# Patient Record
Sex: MALE | Race: WHITE | Employment: FULL TIME | URBAN - NONMETROPOLITAN AREA
[De-identification: names, ages, dates, MRNs, and addresses within clinical notes are randomized per-mention and may not be internally consistent; named-entity substitution may affect disease eponyms.]

---

## 2018-04-12 ENCOUNTER — HOSPITAL ENCOUNTER (INPATIENT)
Age: 38
LOS: 2 days | Discharge: HOME | DRG: 494 | End: 2018-04-14
Attending: FAMILY MEDICINE | Admitting: ORTHOPAEDIC SURGERY
Payer: COMMERCIAL

## 2018-04-12 ENCOUNTER — APPOINTMENT (OUTPATIENT)
Dept: GENERAL RADIOLOGY | Age: 38
DRG: 494 | End: 2018-04-12
Payer: COMMERCIAL

## 2018-04-12 ENCOUNTER — APPOINTMENT (OUTPATIENT)
Dept: CT IMAGING | Age: 38
DRG: 494 | End: 2018-04-12
Payer: COMMERCIAL

## 2018-04-12 ENCOUNTER — ANESTHESIA EVENT (OUTPATIENT)
Dept: OPERATING ROOM | Age: 38
DRG: 494 | End: 2018-04-12
Payer: COMMERCIAL

## 2018-04-12 DIAGNOSIS — S42.202A CLOSED FRACTURE OF PROXIMAL END OF LEFT HUMERUS, UNSPECIFIED FRACTURE MORPHOLOGY, INITIAL ENCOUNTER: ICD-10-CM
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
## 2018-04-12 DIAGNOSIS — S42.322A CLOSED DISPLACED TRANSVERSE FRACTURE OF SHAFT OF LEFT HUMERUS, INITIAL ENCOUNTER: ICD-10-CM

## 2018-04-12 DIAGNOSIS — S42.292A HUMERUS HEAD FRACTURE, LEFT, CLOSED, INITIAL ENCOUNTER: ICD-10-CM
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
## 2018-04-12 PROBLEM — S42.392A: Status: ACTIVE | Noted: 2018-04-12

## 2018-04-12 LAB
ALBUMIN SERPL-MCNC: 4.7 G/DL (ref 3.5–5.1)
ALP BLD-CCNC: 60 U/L (ref 38–126)
ALT SERPL-CCNC: 17 U/L (ref 11–66)
ANION GAP SERPL CALCULATED.3IONS-SCNC: 13 MEQ/L (ref 8–16)
ANISOCYTOSIS: ABNORMAL
AST SERPL-CCNC: 22 U/L (ref 5–40)
BASOPHILS # BLD: 0.1 %
BASOPHILS ABSOLUTE: 0 THOU/MM3 (ref 0–0.1)
BILIRUB SERPL-MCNC: 1.2 MG/DL (ref 0.3–1.2)
BUN BLDV-MCNC: 13 MG/DL (ref 7–22)
CALCIUM SERPL-MCNC: 9.2 MG/DL (ref 8.5–10.5)
CHLORIDE BLD-SCNC: 105 MEQ/L (ref 98–111)
CO2: 21 MEQ/L (ref 23–33)
CREAT SERPL-MCNC: 0.7 MG/DL (ref 0.4–1.2)
EKG ATRIAL RATE: 73 BPM
EKG P AXIS: 36 DEGREES
EKG P-R INTERVAL: 140 MS
EKG Q-T INTERVAL: 384 MS
EKG QRS DURATION: 102 MS
EKG QTC CALCULATION (BAZETT): 423 MS
EKG R AXIS: 65 DEGREES
EKG T AXIS: 61 DEGREES
EKG VENTRICULAR RATE: 73 BPM
EOSINOPHIL # BLD: 0.1 %
EOSINOPHILS ABSOLUTE: 0 THOU/MM3 (ref 0–0.4)
GFR SERPL CREATININE-BSD FRML MDRD: > 90 ML/MIN/1.73M2
GLUCOSE BLD-MCNC: 122 MG/DL (ref 70–108)
HCT VFR BLD CALC: 39.3 % (ref 42–52)
HEMOGLOBIN: 14 GM/DL (ref 14–18)
LYMPHOCYTES # BLD: 2.4 %
LYMPHOCYTES ABSOLUTE: 0.7 THOU/MM3 (ref 1–4.8)
MCH RBC QN AUTO: 31.3 PG (ref 27–31)
MCHC RBC AUTO-ENTMCNC: 35.6 GM/DL (ref 33–37)
MCV RBC AUTO: 87.9 FL (ref 80–94)
MONOCYTES # BLD: 2.6 %
MONOCYTES ABSOLUTE: 0.7 THOU/MM3 (ref 0.4–1.3)
NUCLEATED RED BLOOD CELLS: 0 /100 WBC
OSMOLALITY CALCULATION: 279 MOSMOL/KG (ref 275–300)
PATHOLOGIST REVIEW: ABNORMAL
PDW BLD-RTO: 15.9 % (ref 11.5–14.5)
PLATELET # BLD: 210 THOU/MM3 (ref 130–400)
PLATELET ESTIMATE: ADEQUATE
PMV BLD AUTO: 9.5 FL (ref 7.4–10.4)
POIKILOCYTES: SLIGHT
POTASSIUM SERPL-SCNC: 3.9 MEQ/L (ref 3.5–5.2)
RBC # BLD: 4.47 MILL/MM3 (ref 4.7–6.1)
SCAN OF BLOOD SMEAR: NORMAL
SEG NEUTROPHILS: 94.8 %
SEGMENTED NEUTROPHILS ABSOLUTE COUNT: 26.4 THOU/MM3 (ref 1.8–7.7)
SODIUM BLD-SCNC: 139 MEQ/L (ref 135–145)
TOTAL PROTEIN: 7.1 G/DL (ref 6.1–8)
WBC # BLD: 27.8 THOU/MM3 (ref 4.8–10.8)

## 2018-04-12 PROCEDURE — 6820000001 HC L2 TRAUMA SURGERY EVALUATION

## 2018-04-12 PROCEDURE — 96376 TX/PRO/DX INJ SAME DRUG ADON: CPT

## 2018-04-12 PROCEDURE — 73070 X-RAY EXAM OF ELBOW: CPT

## 2018-04-12 PROCEDURE — 73030 X-RAY EXAM OF SHOULDER: CPT

## 2018-04-12 PROCEDURE — 70450 CT HEAD/BRAIN W/O DYE: CPT

## 2018-04-12 PROCEDURE — 6370000000 HC RX 637 (ALT 250 FOR IP): Performed by: NURSE PRACTITIONER

## 2018-04-12 PROCEDURE — 99285 EMERGENCY DEPT VISIT HI MDM: CPT

## 2018-04-12 PROCEDURE — 74177 CT ABD & PELVIS W/CONTRAST: CPT

## 2018-04-12 PROCEDURE — 96374 THER/PROPH/DIAG INJ IV PUSH: CPT

## 2018-04-12 PROCEDURE — 93005 ELECTROCARDIOGRAM TRACING: CPT | Performed by: FAMILY MEDICINE

## 2018-04-12 PROCEDURE — 6360000002 HC RX W HCPCS: Performed by: EMERGENCY MEDICINE

## 2018-04-12 PROCEDURE — 80053 COMPREHEN METABOLIC PANEL: CPT

## 2018-04-12 PROCEDURE — 36415 COLL VENOUS BLD VENIPUNCTURE: CPT

## 2018-04-12 PROCEDURE — 96375 TX/PRO/DX INJ NEW DRUG ADDON: CPT

## 2018-04-12 PROCEDURE — 71260 CT THORAX DX C+: CPT

## 2018-04-12 PROCEDURE — 1200000003 HC TELEMETRY R&B

## 2018-04-12 PROCEDURE — 73060 X-RAY EXAM OF HUMERUS: CPT

## 2018-04-12 PROCEDURE — 2580000003 HC RX 258: Performed by: NURSE PRACTITIONER

## 2018-04-12 PROCEDURE — 72125 CT NECK SPINE W/O DYE: CPT

## 2018-04-12 PROCEDURE — 85025 COMPLETE CBC W/AUTO DIFF WBC: CPT

## 2018-04-12 PROCEDURE — 99253 IP/OBS CNSLTJ NEW/EST LOW 45: CPT | Performed by: SURGERY

## 2018-04-12 PROCEDURE — 6360000004 HC RX CONTRAST MEDICATION: Performed by: EMERGENCY MEDICINE

## 2018-04-12 RX ORDER — ACETAMINOPHEN 325 MG/1
650 TABLET ORAL EVERY 4 HOURS PRN
Status: DISCONTINUED | OUTPATIENT
Start: 2018-04-12 | End: 2018-04-12 | Stop reason: SDUPTHER

## 2018-04-12 RX ORDER — MORPHINE SULFATE 4 MG/ML
4 INJECTION, SOLUTION INTRAMUSCULAR; INTRAVENOUS ONCE
Status: COMPLETED | OUTPATIENT
Start: 2018-04-12 | End: 2018-04-12

## 2018-04-12 RX ORDER — MORPHINE SULFATE 2 MG/ML
2 INJECTION, SOLUTION INTRAMUSCULAR; INTRAVENOUS ONCE
Status: COMPLETED | OUTPATIENT
Start: 2018-04-12 | End: 2018-04-12

## 2018-04-12 RX ORDER — ACETAMINOPHEN 325 MG/1
650 TABLET ORAL EVERY 4 HOURS PRN
Status: DISCONTINUED | OUTPATIENT
Start: 2018-04-12 | End: 2018-04-14 | Stop reason: HOSPADM

## 2018-04-12 RX ORDER — SODIUM CHLORIDE 0.9 % (FLUSH) 0.9 %
10 SYRINGE (ML) INJECTION EVERY 12 HOURS SCHEDULED
Status: DISCONTINUED | OUTPATIENT
Start: 2018-04-12 | End: 2018-04-12 | Stop reason: SDUPTHER

## 2018-04-12 RX ORDER — ONDANSETRON 2 MG/ML
4 INJECTION INTRAMUSCULAR; INTRAVENOUS EVERY 6 HOURS PRN
Status: DISCONTINUED | OUTPATIENT
Start: 2018-04-12 | End: 2018-04-12 | Stop reason: SDUPTHER

## 2018-04-12 RX ORDER — SODIUM CHLORIDE 0.9 % (FLUSH) 0.9 %
10 SYRINGE (ML) INJECTION EVERY 12 HOURS SCHEDULED
Status: DISCONTINUED | OUTPATIENT
Start: 2018-04-12 | End: 2018-04-14 | Stop reason: HOSPADM

## 2018-04-12 RX ORDER — ONDANSETRON 2 MG/ML
4 INJECTION INTRAMUSCULAR; INTRAVENOUS ONCE
Status: COMPLETED | OUTPATIENT
Start: 2018-04-12 | End: 2018-04-12

## 2018-04-12 RX ORDER — MORPHINE SULFATE 4 MG/ML
4 INJECTION, SOLUTION INTRAMUSCULAR; INTRAVENOUS
Status: ACTIVE | OUTPATIENT
Start: 2018-04-12 | End: 2018-04-13

## 2018-04-12 RX ORDER — OXYCODONE HYDROCHLORIDE AND ACETAMINOPHEN 5; 325 MG/1; MG/1
1 TABLET ORAL EVERY 4 HOURS PRN
Status: DISCONTINUED | OUTPATIENT
Start: 2018-04-12 | End: 2018-04-12 | Stop reason: SDUPTHER

## 2018-04-12 RX ORDER — MORPHINE SULFATE 2 MG/ML
2 INJECTION, SOLUTION INTRAMUSCULAR; INTRAVENOUS
Status: ACTIVE | OUTPATIENT
Start: 2018-04-12 | End: 2018-04-13

## 2018-04-12 RX ORDER — SODIUM CHLORIDE 9 MG/ML
INJECTION, SOLUTION INTRAVENOUS CONTINUOUS
Status: DISCONTINUED | OUTPATIENT
Start: 2018-04-12 | End: 2018-04-14 | Stop reason: HOSPADM

## 2018-04-12 RX ORDER — SODIUM CHLORIDE 0.9 % (FLUSH) 0.9 %
10 SYRINGE (ML) INJECTION PRN
Status: DISCONTINUED | OUTPATIENT
Start: 2018-04-12 | End: 2018-04-12 | Stop reason: SDUPTHER

## 2018-04-12 RX ORDER — MORPHINE SULFATE 4 MG/ML
4 INJECTION, SOLUTION INTRAMUSCULAR; INTRAVENOUS
Status: DISCONTINUED | OUTPATIENT
Start: 2018-04-12 | End: 2018-04-12 | Stop reason: SDUPTHER

## 2018-04-12 RX ORDER — ONDANSETRON 2 MG/ML
4 INJECTION INTRAMUSCULAR; INTRAVENOUS EVERY 6 HOURS PRN
Status: DISCONTINUED | OUTPATIENT
Start: 2018-04-12 | End: 2018-04-14 | Stop reason: HOSPADM

## 2018-04-12 RX ORDER — OXYCODONE HYDROCHLORIDE AND ACETAMINOPHEN 5; 325 MG/1; MG/1
2 TABLET ORAL EVERY 4 HOURS PRN
Status: DISCONTINUED | OUTPATIENT
Start: 2018-04-12 | End: 2018-04-14 | Stop reason: HOSPADM

## 2018-04-12 RX ORDER — OXYCODONE HYDROCHLORIDE AND ACETAMINOPHEN 5; 325 MG/1; MG/1
2 TABLET ORAL EVERY 4 HOURS PRN
Status: DISCONTINUED | OUTPATIENT
Start: 2018-04-12 | End: 2018-04-12 | Stop reason: SDUPTHER

## 2018-04-12 RX ORDER — OXYCODONE HYDROCHLORIDE AND ACETAMINOPHEN 5; 325 MG/1; MG/1
1 TABLET ORAL EVERY 4 HOURS PRN
Status: DISCONTINUED | OUTPATIENT
Start: 2018-04-12 | End: 2018-04-14 | Stop reason: HOSPADM

## 2018-04-12 RX ORDER — SODIUM CHLORIDE 0.9 % (FLUSH) 0.9 %
10 SYRINGE (ML) INJECTION PRN
Status: DISCONTINUED | OUTPATIENT
Start: 2018-04-12 | End: 2018-04-14 | Stop reason: HOSPADM

## 2018-04-12 RX ORDER — MORPHINE SULFATE 2 MG/ML
2 INJECTION, SOLUTION INTRAMUSCULAR; INTRAVENOUS
Status: DISCONTINUED | OUTPATIENT
Start: 2018-04-12 | End: 2018-04-12 | Stop reason: SDUPTHER

## 2018-04-12 RX ADMIN — MORPHINE SULFATE 4 MG: 4 INJECTION, SOLUTION INTRAMUSCULAR; INTRAVENOUS at 09:00

## 2018-04-12 RX ADMIN — SODIUM CHLORIDE: 9 INJECTION, SOLUTION INTRAVENOUS at 14:36

## 2018-04-12 RX ADMIN — OXYCODONE HYDROCHLORIDE AND ACETAMINOPHEN 2 TABLET: 5; 325 TABLET ORAL at 20:36

## 2018-04-12 RX ADMIN — MORPHINE SULFATE 2 MG: 2 INJECTION, SOLUTION INTRAMUSCULAR; INTRAVENOUS at 10:27

## 2018-04-12 RX ADMIN — OXYCODONE HYDROCHLORIDE AND ACETAMINOPHEN 2 TABLET: 5; 325 TABLET ORAL at 14:34

## 2018-04-12 RX ADMIN — MORPHINE SULFATE 4 MG: 4 INJECTION, SOLUTION INTRAMUSCULAR; INTRAVENOUS at 07:52

## 2018-04-12 RX ADMIN — Medication 10 ML: at 20:35

## 2018-04-12 RX ADMIN — MORPHINE SULFATE 4 MG: 4 INJECTION, SOLUTION INTRAMUSCULAR; INTRAVENOUS at 06:52

## 2018-04-12 RX ADMIN — IOPAMIDOL 80 ML: 755 INJECTION, SOLUTION INTRAVENOUS at 07:39

## 2018-04-12 RX ADMIN — ONDANSETRON 4 MG: 2 INJECTION INTRAMUSCULAR; INTRAVENOUS at 06:52

## 2018-04-12 ASSESSMENT — PAIN DESCRIPTION - ORIENTATION
ORIENTATION: LEFT

## 2018-04-12 ASSESSMENT — PAIN DESCRIPTION - PAIN TYPE
TYPE: ACUTE PAIN

## 2018-04-12 ASSESSMENT — ENCOUNTER SYMPTOMS
VOMITING: 0
ABDOMINAL DISTENTION: 0
EYE PAIN: 0
BACK PAIN: 0
TROUBLE SWALLOWING: 0
SORE THROAT: 0
COUGH: 0
COLOR CHANGE: 0
EYES NEGATIVE: 1
SHORTNESS OF BREATH: 0
ABDOMINAL PAIN: 0
FACIAL SWELLING: 0
NAUSEA: 0
CHOKING: 0
VOICE CHANGE: 0
CHEST TIGHTNESS: 0

## 2018-04-12 ASSESSMENT — PAIN SCALES - GENERAL
PAINLEVEL_OUTOF10: 9
PAINLEVEL_OUTOF10: 8
PAINLEVEL_OUTOF10: 9
PAINLEVEL_OUTOF10: 10
PAINLEVEL_OUTOF10: 10
PAINLEVEL_OUTOF10: 8
PAINLEVEL_OUTOF10: 8
PAINLEVEL_OUTOF10: 10
PAINLEVEL_OUTOF10: 10
PAINLEVEL_OUTOF10: 6

## 2018-04-12 ASSESSMENT — PAIN DESCRIPTION - LOCATION
LOCATION: ARM
LOCATION: ARM;SHOULDER
LOCATION: ARM

## 2018-04-12 ASSESSMENT — PAIN DESCRIPTION - PROGRESSION
CLINICAL_PROGRESSION: GRADUALLY IMPROVING
CLINICAL_PROGRESSION: NOT CHANGED
CLINICAL_PROGRESSION: GRADUALLY IMPROVING

## 2018-04-12 ASSESSMENT — PAIN DESCRIPTION - DESCRIPTORS
DESCRIPTORS: ACHING;SHARP
DESCRIPTORS: ACHING
DESCRIPTORS: ACHING;SHARP

## 2018-04-12 ASSESSMENT — PAIN DESCRIPTION - ONSET
ONSET: ON-GOING
ONSET: ON-GOING

## 2018-04-12 ASSESSMENT — PAIN DESCRIPTION - FREQUENCY
FREQUENCY: CONTINUOUS

## 2018-04-13 ENCOUNTER — ANESTHESIA (OUTPATIENT)
Dept: OPERATING ROOM | Age: 38
DRG: 494 | End: 2018-04-13
Payer: COMMERCIAL

## 2018-04-13 ENCOUNTER — APPOINTMENT (OUTPATIENT)
Dept: GENERAL RADIOLOGY | Age: 38
DRG: 494 | End: 2018-04-13
Payer: COMMERCIAL

## 2018-04-13 VITALS
RESPIRATION RATE: 14 BRPM | OXYGEN SATURATION: 100 % | DIASTOLIC BLOOD PRESSURE: 93 MMHG | SYSTOLIC BLOOD PRESSURE: 158 MMHG | TEMPERATURE: 97.9 F

## 2018-04-13 LAB
ANISOCYTOSIS: ABNORMAL
BACTERIA: ABNORMAL /HPF
BASOPHILS # BLD: 0.3 %
BASOPHILS ABSOLUTE: 0 THOU/MM3 (ref 0–0.1)
BILIRUBIN URINE: NEGATIVE
BLOOD, URINE: NEGATIVE
CASTS 2: ABNORMAL /LPF
CASTS UA: ABNORMAL /LPF
CHARACTER, URINE: ABNORMAL
COLOR: YELLOW
CRYSTALS, UA: ABNORMAL
EOSINOPHIL # BLD: 3.8 %
EOSINOPHILS ABSOLUTE: 0.4 THOU/MM3 (ref 0–0.4)
EPITHELIAL CELLS, UA: ABNORMAL /HPF
GLUCOSE URINE: NEGATIVE MG/DL
HCT VFR BLD CALC: 32.1 % (ref 42–52)
HEMOGLOBIN: 11.7 GM/DL (ref 14–18)
KETONES, URINE: NEGATIVE
LEUKOCYTE ESTERASE, URINE: NEGATIVE
LYMPHOCYTES # BLD: 13.3 %
LYMPHOCYTES ABSOLUTE: 1.5 THOU/MM3 (ref 1–4.8)
MCH RBC QN AUTO: 31.4 PG (ref 27–31)
MCHC RBC AUTO-ENTMCNC: 36.4 GM/DL (ref 33–37)
MCV RBC AUTO: 86.3 FL (ref 80–94)
MISCELLANEOUS 2: ABNORMAL
MONOCYTES # BLD: 6.5 %
MONOCYTES ABSOLUTE: 0.7 THOU/MM3 (ref 0.4–1.3)
NITRITE, URINE: NEGATIVE
NUCLEATED RED BLOOD CELLS: 0 /100 WBC
PDW BLD-RTO: 15.7 % (ref 11.5–14.5)
PH UA: 6
PLATELET # BLD: 163 THOU/MM3 (ref 130–400)
PMV BLD AUTO: 9 FL (ref 7.4–10.4)
PROTEIN UA: NEGATIVE
RBC # BLD: 3.72 MILL/MM3 (ref 4.7–6.1)
RBC URINE: ABNORMAL /HPF
RENAL EPITHELIAL, UA: ABNORMAL
SEG NEUTROPHILS: 76.1 %
SEGMENTED NEUTROPHILS ABSOLUTE COUNT: 8.5 THOU/MM3 (ref 1.8–7.7)
SPECIFIC GRAVITY, URINE: 1.02 (ref 1–1.03)
UROBILINOGEN, URINE: 1 EU/DL
WBC # BLD: 11.2 THOU/MM3 (ref 4.8–10.8)
WBC UA: ABNORMAL /HPF
YEAST: ABNORMAL

## 2018-04-13 PROCEDURE — C1713 ANCHOR/SCREW BN/BN,TIS/BN: HCPCS | Performed by: ORTHOPAEDIC SURGERY

## 2018-04-13 PROCEDURE — 73060 X-RAY EXAM OF HUMERUS: CPT

## 2018-04-13 PROCEDURE — 7100000000 HC PACU RECOVERY - FIRST 15 MIN: Performed by: ORTHOPAEDIC SURGERY

## 2018-04-13 PROCEDURE — 1200000003 HC TELEMETRY R&B

## 2018-04-13 PROCEDURE — 2720000010 HC SURG SUPPLY STERILE: Performed by: ORTHOPAEDIC SURGERY

## 2018-04-13 PROCEDURE — 2500000003 HC RX 250 WO HCPCS: Performed by: NURSE ANESTHETIST, CERTIFIED REGISTERED

## 2018-04-13 PROCEDURE — 6360000002 HC RX W HCPCS

## 2018-04-13 PROCEDURE — 36415 COLL VENOUS BLD VENIPUNCTURE: CPT

## 2018-04-13 PROCEDURE — 3209999900 FLUORO FOR SURGICAL PROCEDURES

## 2018-04-13 PROCEDURE — 0PSG04Z REPOSITION LEFT HUMERAL SHAFT WITH INTERNAL FIXATION DEVICE, OPEN APPROACH: ICD-10-PCS | Performed by: ORTHOPAEDIC SURGERY

## 2018-04-13 PROCEDURE — 3600000015 HC SURGERY LEVEL 5 ADDTL 15MIN: Performed by: ORTHOPAEDIC SURGERY

## 2018-04-13 PROCEDURE — 81001 URINALYSIS AUTO W/SCOPE: CPT

## 2018-04-13 PROCEDURE — 7100000001 HC PACU RECOVERY - ADDTL 15 MIN: Performed by: ORTHOPAEDIC SURGERY

## 2018-04-13 PROCEDURE — 6360000002 HC RX W HCPCS: Performed by: NURSE PRACTITIONER

## 2018-04-13 PROCEDURE — 3700000000 HC ANESTHESIA ATTENDED CARE: Performed by: ORTHOPAEDIC SURGERY

## 2018-04-13 PROCEDURE — 2580000003 HC RX 258: Performed by: NURSE PRACTITIONER

## 2018-04-13 PROCEDURE — 3700000001 HC ADD 15 MINUTES (ANESTHESIA): Performed by: ORTHOPAEDIC SURGERY

## 2018-04-13 PROCEDURE — 85025 COMPLETE CBC W/AUTO DIFF WBC: CPT

## 2018-04-13 PROCEDURE — 6370000000 HC RX 637 (ALT 250 FOR IP): Performed by: NURSE PRACTITIONER

## 2018-04-13 PROCEDURE — 3600000005 HC SURGERY LEVEL 5 BASE: Performed by: ORTHOPAEDIC SURGERY

## 2018-04-13 PROCEDURE — 6360000002 HC RX W HCPCS: Performed by: NURSE ANESTHETIST, CERTIFIED REGISTERED

## 2018-04-13 DEVICE — PERI-LOC 4.5MM T25 CORTEX SCREW                                    22MM SELF-TAPPING
Type: IMPLANTABLE DEVICE | Site: HUMERUS | Status: FUNCTIONAL
Brand: PERI-LOC

## 2018-04-13 DEVICE — PERI-LOC 4.5MM T25 LOCKING SCREW                                    18MM SELF-TAPPING
Type: IMPLANTABLE DEVICE | Site: HUMERUS | Status: FUNCTIONAL
Brand: PERI-LOC

## 2018-04-13 DEVICE — PERI-LOC 2.7MM SELF-TAPPING CORTEX                                    SCREW 24MM
Type: IMPLANTABLE DEVICE | Site: HUMERUS | Status: FUNCTIONAL
Brand: PERI-LOC

## 2018-04-13 DEVICE — PERI-LOC 4.5MM T25 LOCKING SCREW                                    30MM SELF-TAPPING
Type: IMPLANTABLE DEVICE | Site: HUMERUS | Status: FUNCTIONAL
Brand: PERI-LOC

## 2018-04-13 DEVICE — PERI-LOC VLP 5.0MM X 38MM                                    OSTEOPENIA SCREW PARTIALLY THREADED
Type: IMPLANTABLE DEVICE | Site: HUMERUS | Status: FUNCTIONAL
Brand: PERI-LOC VLP

## 2018-04-13 DEVICE — PERI-LOC 4.5MM T25 CORTEX SCREW                                    26MM SELF-TAPPING
Type: IMPLANTABLE DEVICE | Site: HUMERUS | Status: FUNCTIONAL
Brand: PERI-LOC

## 2018-04-13 DEVICE — PERI-LOC 4.5MM T25 LOCKING SCREW                                    38MM SELF-TAPPING
Type: IMPLANTABLE DEVICE | Site: HUMERUS | Status: FUNCTIONAL
Brand: PERI-LOC

## 2018-04-13 DEVICE — PERI-LOC 4.5MM T25 LOCKING SCREW                                    44MM SELF-TAPPING
Type: IMPLANTABLE DEVICE | Site: HUMERUS | Status: FUNCTIONAL
Brand: PERI-LOC

## 2018-04-13 DEVICE — PERI-LOC 4.5MM PROXIMAL HUMERUS                                    LOCKING PLATE 15 HOLE LEFT 246MM
Type: IMPLANTABLE DEVICE | Site: HUMERUS | Status: FUNCTIONAL
Brand: PERI-LOC

## 2018-04-13 DEVICE — PERI-LOC 2.7MM SELF-TAPPING CORTEX                                    SCREW 26MM
Type: IMPLANTABLE DEVICE | Site: HUMERUS | Status: FUNCTIONAL
Brand: PERI-LOC

## 2018-04-13 RX ORDER — SUCCINYLCHOLINE CHLORIDE 20 MG/ML
INJECTION INTRAMUSCULAR; INTRAVENOUS PRN
Status: DISCONTINUED | OUTPATIENT
Start: 2018-04-13 | End: 2018-04-13 | Stop reason: SDUPTHER

## 2018-04-13 RX ORDER — LIDOCAINE HYDROCHLORIDE 20 MG/ML
INJECTION, SOLUTION INFILTRATION; PERINEURAL PRN
Status: DISCONTINUED | OUTPATIENT
Start: 2018-04-13 | End: 2018-04-13 | Stop reason: SDUPTHER

## 2018-04-13 RX ORDER — ONDANSETRON 2 MG/ML
INJECTION INTRAMUSCULAR; INTRAVENOUS PRN
Status: DISCONTINUED | OUTPATIENT
Start: 2018-04-13 | End: 2018-04-13 | Stop reason: SDUPTHER

## 2018-04-13 RX ORDER — ESMOLOL HYDROCHLORIDE 10 MG/ML
INJECTION INTRAVENOUS PRN
Status: DISCONTINUED | OUTPATIENT
Start: 2018-04-13 | End: 2018-04-13 | Stop reason: SDUPTHER

## 2018-04-13 RX ORDER — DEXAMETHASONE SODIUM PHOSPHATE 4 MG/ML
INJECTION, SOLUTION INTRA-ARTICULAR; INTRALESIONAL; INTRAMUSCULAR; INTRAVENOUS; SOFT TISSUE PRN
Status: DISCONTINUED | OUTPATIENT
Start: 2018-04-13 | End: 2018-04-13 | Stop reason: SDUPTHER

## 2018-04-13 RX ORDER — ROCURONIUM BROMIDE 10 MG/ML
INJECTION, SOLUTION INTRAVENOUS PRN
Status: DISCONTINUED | OUTPATIENT
Start: 2018-04-13 | End: 2018-04-13 | Stop reason: SDUPTHER

## 2018-04-13 RX ORDER — FENTANYL CITRATE 50 UG/ML
50 INJECTION, SOLUTION INTRAMUSCULAR; INTRAVENOUS EVERY 5 MIN PRN
Status: DISCONTINUED | OUTPATIENT
Start: 2018-04-13 | End: 2018-04-13 | Stop reason: HOSPADM

## 2018-04-13 RX ORDER — OXYCODONE HYDROCHLORIDE AND ACETAMINOPHEN 5; 325 MG/1; MG/1
1-2 TABLET ORAL EVERY 4 HOURS PRN
Qty: 60 TABLET | Refills: 0 | Status: SHIPPED | OUTPATIENT
Start: 2018-04-13 | End: 2018-04-20 | Stop reason: ALTCHOICE

## 2018-04-13 RX ORDER — FENTANYL CITRATE 50 UG/ML
INJECTION, SOLUTION INTRAMUSCULAR; INTRAVENOUS PRN
Status: DISCONTINUED | OUTPATIENT
Start: 2018-04-13 | End: 2018-04-13 | Stop reason: SDUPTHER

## 2018-04-13 RX ORDER — CEFAZOLIN SODIUM 1 G/3ML
INJECTION, POWDER, FOR SOLUTION INTRAMUSCULAR; INTRAVENOUS PRN
Status: DISCONTINUED | OUTPATIENT
Start: 2018-04-13 | End: 2018-04-13 | Stop reason: SDUPTHER

## 2018-04-13 RX ORDER — LIDOCAINE HYDROCHLORIDE 10 MG/ML
INJECTION, SOLUTION INFILTRATION; PERINEURAL PRN
Status: DISCONTINUED | OUTPATIENT
Start: 2018-04-13 | End: 2018-04-13 | Stop reason: SDUPTHER

## 2018-04-13 RX ORDER — NEOSTIGMINE METHYLSULFATE 1 MG/ML
INJECTION, SOLUTION INTRAVENOUS PRN
Status: DISCONTINUED | OUTPATIENT
Start: 2018-04-13 | End: 2018-04-13 | Stop reason: SDUPTHER

## 2018-04-13 RX ORDER — ONDANSETRON 2 MG/ML
4 INJECTION INTRAMUSCULAR; INTRAVENOUS
Status: DISCONTINUED | OUTPATIENT
Start: 2018-04-13 | End: 2018-04-13 | Stop reason: HOSPADM

## 2018-04-13 RX ORDER — GLYCOPYRROLATE 1 MG/5 ML
SYRINGE (ML) INTRAVENOUS PRN
Status: DISCONTINUED | OUTPATIENT
Start: 2018-04-13 | End: 2018-04-13 | Stop reason: SDUPTHER

## 2018-04-13 RX ORDER — PROPOFOL 10 MG/ML
INJECTION, EMULSION INTRAVENOUS PRN
Status: DISCONTINUED | OUTPATIENT
Start: 2018-04-13 | End: 2018-04-13 | Stop reason: SDUPTHER

## 2018-04-13 RX ORDER — LABETALOL HYDROCHLORIDE 5 MG/ML
5 INJECTION, SOLUTION INTRAVENOUS EVERY 10 MIN PRN
Status: DISCONTINUED | OUTPATIENT
Start: 2018-04-13 | End: 2018-04-13 | Stop reason: HOSPADM

## 2018-04-13 RX ORDER — MORPHINE SULFATE 2 MG/ML
2 INJECTION, SOLUTION INTRAMUSCULAR; INTRAVENOUS
Status: DISCONTINUED | OUTPATIENT
Start: 2018-04-13 | End: 2018-04-14 | Stop reason: HOSPADM

## 2018-04-13 RX ORDER — DOCUSATE SODIUM 100 MG/1
100 CAPSULE, LIQUID FILLED ORAL DAILY
Status: DISCONTINUED | OUTPATIENT
Start: 2018-04-13 | End: 2018-04-14 | Stop reason: HOSPADM

## 2018-04-13 RX ORDER — MORPHINE SULFATE 4 MG/ML
4 INJECTION, SOLUTION INTRAMUSCULAR; INTRAVENOUS
Status: DISCONTINUED | OUTPATIENT
Start: 2018-04-13 | End: 2018-04-14 | Stop reason: HOSPADM

## 2018-04-13 RX ORDER — SCOLOPAMINE TRANSDERMAL SYSTEM 1 MG/1
1 PATCH, EXTENDED RELEASE TRANSDERMAL
Status: DISCONTINUED | OUTPATIENT
Start: 2018-04-13 | End: 2018-04-14 | Stop reason: HOSPADM

## 2018-04-13 RX ORDER — MEPERIDINE HYDROCHLORIDE 25 MG/ML
12.5 INJECTION INTRAMUSCULAR; INTRAVENOUS; SUBCUTANEOUS EVERY 5 MIN PRN
Status: DISCONTINUED | OUTPATIENT
Start: 2018-04-13 | End: 2018-04-13 | Stop reason: HOSPADM

## 2018-04-13 RX ORDER — PROMETHAZINE HYDROCHLORIDE 25 MG/ML
INJECTION, SOLUTION INTRAMUSCULAR; INTRAVENOUS PRN
Status: DISCONTINUED | OUTPATIENT
Start: 2018-04-13 | End: 2018-04-13 | Stop reason: SDUPTHER

## 2018-04-13 RX ORDER — MORPHINE SULFATE 2 MG/ML
INJECTION, SOLUTION INTRAMUSCULAR; INTRAVENOUS
Status: COMPLETED
Start: 2018-04-13 | End: 2018-04-13

## 2018-04-13 RX ORDER — KETAMINE HYDROCHLORIDE 50 MG/ML
INJECTION, SOLUTION, CONCENTRATE INTRAMUSCULAR; INTRAVENOUS PRN
Status: DISCONTINUED | OUTPATIENT
Start: 2018-04-13 | End: 2018-04-13 | Stop reason: SDUPTHER

## 2018-04-13 RX ORDER — HYDROMORPHONE HCL 110MG/55ML
PATIENT CONTROLLED ANALGESIA SYRINGE INTRAVENOUS PRN
Status: DISCONTINUED | OUTPATIENT
Start: 2018-04-13 | End: 2018-04-13 | Stop reason: SDUPTHER

## 2018-04-13 RX ORDER — MIDAZOLAM HYDROCHLORIDE 1 MG/ML
INJECTION INTRAMUSCULAR; INTRAVENOUS PRN
Status: DISCONTINUED | OUTPATIENT
Start: 2018-04-13 | End: 2018-04-13 | Stop reason: SDUPTHER

## 2018-04-13 RX ORDER — ONDANSETRON 4 MG/1
4 TABLET, FILM COATED ORAL EVERY 6 HOURS PRN
Qty: 12 TABLET | Refills: 1 | Status: SHIPPED | OUTPATIENT
Start: 2018-04-13 | End: 2018-05-22

## 2018-04-13 RX ADMIN — MIDAZOLAM HYDROCHLORIDE 2 MG: 1 INJECTION, SOLUTION INTRAMUSCULAR; INTRAVENOUS at 14:28

## 2018-04-13 RX ADMIN — PROPOFOL 200 MG: 10 INJECTION, EMULSION INTRAVENOUS at 14:28

## 2018-04-13 RX ADMIN — OXYCODONE HYDROCHLORIDE AND ACETAMINOPHEN 2 TABLET: 5; 325 TABLET ORAL at 23:42

## 2018-04-13 RX ADMIN — SODIUM CHLORIDE: 9 INJECTION, SOLUTION INTRAVENOUS at 00:41

## 2018-04-13 RX ADMIN — FENTANYL CITRATE 100 MCG: 50 INJECTION INTRAMUSCULAR; INTRAVENOUS at 14:28

## 2018-04-13 RX ADMIN — MORPHINE SULFATE 2 MG: 2 INJECTION, SOLUTION INTRAMUSCULAR; INTRAVENOUS at 11:36

## 2018-04-13 RX ADMIN — Medication 50 MG: at 14:37

## 2018-04-13 RX ADMIN — KETAMINE HYDROCHLORIDE 50 MG: 50 INJECTION, SOLUTION INTRAMUSCULAR; INTRAVENOUS at 14:28

## 2018-04-13 RX ADMIN — NEOSTIGMINE METHYLSULFATE 4 MG: 1 INJECTION, SOLUTION INTRAVENOUS at 15:21

## 2018-04-13 RX ADMIN — ONDANSETRON 4 MG: 2 INJECTION INTRAMUSCULAR; INTRAVENOUS at 14:32

## 2018-04-13 RX ADMIN — HYDROMORPHONE HYDROCHLORIDE 1 MG: 2 INJECTION INTRAMUSCULAR; INTRAVENOUS; SUBCUTANEOUS at 14:52

## 2018-04-13 RX ADMIN — HYDROMORPHONE HYDROCHLORIDE 1 MG: 2 INJECTION INTRAMUSCULAR; INTRAVENOUS; SUBCUTANEOUS at 14:43

## 2018-04-13 RX ADMIN — PROPOFOL 50 MG: 10 INJECTION, EMULSION INTRAVENOUS at 14:54

## 2018-04-13 RX ADMIN — Medication 10 ML: at 20:51

## 2018-04-13 RX ADMIN — OXYCODONE HYDROCHLORIDE AND ACETAMINOPHEN 2 TABLET: 5; 325 TABLET ORAL at 00:41

## 2018-04-13 RX ADMIN — MORPHINE SULFATE 2 MG: 2 INJECTION, SOLUTION INTRAMUSCULAR; INTRAVENOUS at 17:54

## 2018-04-13 RX ADMIN — ESMOLOL HYDROCHLORIDE 10 MG: 10 INJECTION, SOLUTION INTRAVENOUS at 14:44

## 2018-04-13 RX ADMIN — DEXAMETHASONE SODIUM PHOSPHATE 8 MG: 4 INJECTION, SOLUTION INTRAMUSCULAR; INTRAVENOUS at 14:32

## 2018-04-13 RX ADMIN — CEFAZOLIN 2000 MG: 1 INJECTION, POWDER, FOR SOLUTION INTRAMUSCULAR; INTRAVENOUS; PARENTERAL at 14:40

## 2018-04-13 RX ADMIN — Medication 0.8 MG: at 15:21

## 2018-04-13 RX ADMIN — LIDOCAINE HYDROCHLORIDE 50 MG: 10 INJECTION, SOLUTION INFILTRATION; PERINEURAL at 14:28

## 2018-04-13 RX ADMIN — FENTANYL CITRATE 25 MCG: 50 INJECTION INTRAMUSCULAR; INTRAVENOUS at 15:34

## 2018-04-13 RX ADMIN — LIDOCAINE HYDROCHLORIDE 20 MG: 20 INJECTION, SOLUTION INFILTRATION; PERINEURAL at 15:22

## 2018-04-13 RX ADMIN — KETAMINE HYDROCHLORIDE 25 MG: 50 INJECTION, SOLUTION INTRAMUSCULAR; INTRAVENOUS at 15:15

## 2018-04-13 RX ADMIN — ESMOLOL HYDROCHLORIDE 10 MG: 10 INJECTION, SOLUTION INTRAVENOUS at 14:51

## 2018-04-13 RX ADMIN — OXYCODONE HYDROCHLORIDE AND ACETAMINOPHEN 2 TABLET: 5; 325 TABLET ORAL at 19:06

## 2018-04-13 RX ADMIN — PROMETHAZINE HYDROCHLORIDE 12.5 MG: 25 INJECTION INTRAMUSCULAR; INTRAVENOUS at 15:00

## 2018-04-13 RX ADMIN — FENTANYL CITRATE 25 MCG: 50 INJECTION INTRAMUSCULAR; INTRAVENOUS at 15:21

## 2018-04-13 RX ADMIN — FENTANYL CITRATE 50 MCG: 50 INJECTION INTRAMUSCULAR; INTRAVENOUS at 15:46

## 2018-04-13 RX ADMIN — ESMOLOL HYDROCHLORIDE 10 MG: 10 INJECTION, SOLUTION INTRAVENOUS at 14:48

## 2018-04-13 RX ADMIN — SUCCINYLCHOLINE CHLORIDE 100 MG: 20 INJECTION, SOLUTION INTRAMUSCULAR; INTRAVENOUS at 14:28

## 2018-04-13 RX ADMIN — OXYCODONE HYDROCHLORIDE AND ACETAMINOPHEN 2 TABLET: 5; 325 TABLET ORAL at 04:51

## 2018-04-13 RX ADMIN — SODIUM CHLORIDE: 9 INJECTION, SOLUTION INTRAVENOUS at 11:28

## 2018-04-13 ASSESSMENT — PAIN DESCRIPTION - ORIENTATION
ORIENTATION: LEFT

## 2018-04-13 ASSESSMENT — PAIN DESCRIPTION - ONSET
ONSET: ON-GOING

## 2018-04-13 ASSESSMENT — PAIN SCALES - WONG BAKER: WONGBAKER_NUMERICALRESPONSE: 0

## 2018-04-13 ASSESSMENT — PAIN DESCRIPTION - DESCRIPTORS
DESCRIPTORS: ACHING

## 2018-04-13 ASSESSMENT — PAIN SCALES - GENERAL
PAINLEVEL_OUTOF10: 9
PAINLEVEL_OUTOF10: 9
PAINLEVEL_OUTOF10: 7
PAINLEVEL_OUTOF10: 9
PAINLEVEL_OUTOF10: 0
PAINLEVEL_OUTOF10: 5
PAINLEVEL_OUTOF10: 6
PAINLEVEL_OUTOF10: 8
PAINLEVEL_OUTOF10: 7
PAINLEVEL_OUTOF10: 7
PAINLEVEL_OUTOF10: 9
PAINLEVEL_OUTOF10: 6
PAINLEVEL_OUTOF10: 8
PAINLEVEL_OUTOF10: 6
PAINLEVEL_OUTOF10: 9

## 2018-04-13 ASSESSMENT — PAIN DESCRIPTION - PROGRESSION
CLINICAL_PROGRESSION: NOT CHANGED

## 2018-04-13 ASSESSMENT — PULMONARY FUNCTION TESTS
PIF_VALUE: 19
PIF_VALUE: 14
PIF_VALUE: 23
PIF_VALUE: 17
PIF_VALUE: 15
PIF_VALUE: 12
PIF_VALUE: 17
PIF_VALUE: 15
PIF_VALUE: 8
PIF_VALUE: 16
PIF_VALUE: 17
PIF_VALUE: 17
PIF_VALUE: 15
PIF_VALUE: 16
PIF_VALUE: 16
PIF_VALUE: 17
PIF_VALUE: 5
PIF_VALUE: 15
PIF_VALUE: 18
PIF_VALUE: 17
PIF_VALUE: 15
PIF_VALUE: 17
PIF_VALUE: 17
PIF_VALUE: 0
PIF_VALUE: 3
PIF_VALUE: 16
PIF_VALUE: 15
PIF_VALUE: 15
PIF_VALUE: 17
PIF_VALUE: 16
PIF_VALUE: 3
PIF_VALUE: 15
PIF_VALUE: 17
PIF_VALUE: 3
PIF_VALUE: 16
PIF_VALUE: 1
PIF_VALUE: 17
PIF_VALUE: 15
PIF_VALUE: 16
PIF_VALUE: 17
PIF_VALUE: 15
PIF_VALUE: 16
PIF_VALUE: 17
PIF_VALUE: 17
PIF_VALUE: 5
PIF_VALUE: 16
PIF_VALUE: 3
PIF_VALUE: 15
PIF_VALUE: 0
PIF_VALUE: 15
PIF_VALUE: 28
PIF_VALUE: 15
PIF_VALUE: 4
PIF_VALUE: 2
PIF_VALUE: 15
PIF_VALUE: 3
PIF_VALUE: 14
PIF_VALUE: 16
PIF_VALUE: 17
PIF_VALUE: 14
PIF_VALUE: 14
PIF_VALUE: 17
PIF_VALUE: 16
PIF_VALUE: 17
PIF_VALUE: 5
PIF_VALUE: 1
PIF_VALUE: 17
PIF_VALUE: 5
PIF_VALUE: 15
PIF_VALUE: 4
PIF_VALUE: 3
PIF_VALUE: 17
PIF_VALUE: 6
PIF_VALUE: 16
PIF_VALUE: 19
PIF_VALUE: 0

## 2018-04-13 ASSESSMENT — PAIN DESCRIPTION - FREQUENCY
FREQUENCY: CONTINUOUS

## 2018-04-13 ASSESSMENT — PAIN DESCRIPTION - LOCATION
LOCATION: ARM

## 2018-04-13 ASSESSMENT — LIFESTYLE VARIABLES: SMOKING_STATUS: 1

## 2018-04-13 ASSESSMENT — PAIN DESCRIPTION - PAIN TYPE
TYPE: ACUTE PAIN;SURGICAL PAIN
TYPE: ACUTE PAIN
TYPE: ACUTE PAIN;SURGICAL PAIN
TYPE: ACUTE PAIN
TYPE: ACUTE PAIN
TYPE: ACUTE PAIN;SURGICAL PAIN

## 2018-04-14 VITALS
WEIGHT: 196.5 LBS | BODY MASS INDEX: 28.13 KG/M2 | OXYGEN SATURATION: 97 % | DIASTOLIC BLOOD PRESSURE: 88 MMHG | HEIGHT: 70 IN | RESPIRATION RATE: 18 BRPM | SYSTOLIC BLOOD PRESSURE: 145 MMHG | HEART RATE: 113 BPM | TEMPERATURE: 97.3 F

## 2018-04-14 PROCEDURE — 6370000000 HC RX 637 (ALT 250 FOR IP): Performed by: NURSE PRACTITIONER

## 2018-04-14 RX ADMIN — OXYCODONE HYDROCHLORIDE AND ACETAMINOPHEN 2 TABLET: 5; 325 TABLET ORAL at 06:46

## 2018-04-14 RX ADMIN — DOCUSATE SODIUM 100 MG: 100 CAPSULE, LIQUID FILLED ORAL at 07:53

## 2018-04-14 ASSESSMENT — PAIN DESCRIPTION - PAIN TYPE: TYPE: ACUTE PAIN;SURGICAL PAIN

## 2018-04-14 ASSESSMENT — PAIN DESCRIPTION - PROGRESSION: CLINICAL_PROGRESSION: GRADUALLY IMPROVING

## 2018-04-14 ASSESSMENT — PAIN DESCRIPTION - DESCRIPTORS: DESCRIPTORS: ACHING

## 2018-04-14 ASSESSMENT — PAIN SCALES - GENERAL
PAINLEVEL_OUTOF10: 7
PAINLEVEL_OUTOF10: 4

## 2018-04-14 ASSESSMENT — PAIN DESCRIPTION - LOCATION: LOCATION: ARM

## 2018-04-14 ASSESSMENT — PAIN DESCRIPTION - ORIENTATION: ORIENTATION: LEFT

## 2018-04-17 ENCOUNTER — TELEPHONE (OUTPATIENT)
Dept: FAMILY MEDICINE CLINIC | Age: 38
End: 2018-04-17

## 2018-04-20 ENCOUNTER — OFFICE VISIT (OUTPATIENT)
Dept: FAMILY MEDICINE CLINIC | Age: 38
End: 2018-04-20

## 2018-04-20 VITALS
BODY MASS INDEX: 28.06 KG/M2 | WEIGHT: 196 LBS | RESPIRATION RATE: 16 BRPM | HEART RATE: 96 BPM | DIASTOLIC BLOOD PRESSURE: 60 MMHG | HEIGHT: 70 IN | SYSTOLIC BLOOD PRESSURE: 140 MMHG

## 2018-04-20 DIAGNOSIS — S42.302A: Primary | ICD-10-CM

## 2018-04-20 PROCEDURE — 99495 TRANSJ CARE MGMT MOD F2F 14D: CPT | Performed by: FAMILY MEDICINE

## 2018-05-21 ENCOUNTER — APPOINTMENT (OUTPATIENT)
Dept: ULTRASOUND IMAGING | Age: 38
End: 2018-05-21
Payer: COMMERCIAL

## 2018-05-21 ENCOUNTER — HOSPITAL ENCOUNTER (EMERGENCY)
Age: 38
Discharge: HOME | End: 2018-05-22
Attending: EMERGENCY MEDICINE
Payer: COMMERCIAL

## 2018-05-21 ENCOUNTER — APPOINTMENT (OUTPATIENT)
Dept: GENERAL RADIOLOGY | Age: 38
End: 2018-05-21
Payer: COMMERCIAL

## 2018-05-21 DIAGNOSIS — S42.392A: ICD-10-CM

## 2018-05-21 DIAGNOSIS — R17 ELEVATED BILIRUBIN: ICD-10-CM

## 2018-05-21 DIAGNOSIS — K80.50 BILIARY COLIC: ICD-10-CM

## 2018-05-21 DIAGNOSIS — R79.89 ELEVATED LFTS: Primary | ICD-10-CM

## 2018-05-21 LAB
ACETAMINOPHEN LEVEL: 14.3 UG/ML (ref 0–20)
ALBUMIN SERPL-MCNC: 4.6 G/DL (ref 3.5–5.1)
ALP BLD-CCNC: 80 U/L (ref 38–126)
ALT SERPL-CCNC: 100 U/L (ref 11–66)
ANION GAP SERPL CALCULATED.3IONS-SCNC: 13 MEQ/L (ref 8–16)
ANISOCYTOSIS: ABNORMAL
APTT: 30.1 SECONDS (ref 22–38)
AST SERPL-CCNC: 165 U/L (ref 5–40)
BASOPHILS # BLD: 0.3 %
BASOPHILS ABSOLUTE: 0 THOU/MM3 (ref 0–0.1)
BILIRUB SERPL-MCNC: 2.1 MG/DL (ref 0.3–1.2)
BILIRUBIN DIRECT: 1 MG/DL (ref 0–0.3)
BUN BLDV-MCNC: 13 MG/DL (ref 7–22)
CALCIUM SERPL-MCNC: 9.3 MG/DL (ref 8.5–10.5)
CHLORIDE BLD-SCNC: 103 MEQ/L (ref 98–111)
CO2: 24 MEQ/L (ref 23–33)
CREAT SERPL-MCNC: 0.8 MG/DL (ref 0.4–1.2)
EKG ATRIAL RATE: 91 BPM
EKG P AXIS: 62 DEGREES
EKG P-R INTERVAL: 154 MS
EKG Q-T INTERVAL: 364 MS
EKG QRS DURATION: 98 MS
EKG QTC CALCULATION (BAZETT): 447 MS
EKG R AXIS: 56 DEGREES
EKG T AXIS: 62 DEGREES
EKG VENTRICULAR RATE: 91 BPM
EOSINOPHIL # BLD: 0.4 %
EOSINOPHILS ABSOLUTE: 0.1 THOU/MM3 (ref 0–0.4)
ETHYL ALCOHOL, SERUM: < 0.01 %
GFR SERPL CREATININE-BSD FRML MDRD: > 90 ML/MIN/1.73M2
GLUCOSE BLD-MCNC: 113 MG/DL (ref 70–108)
HCT VFR BLD CALC: 35.3 % (ref 42–52)
HEMOGLOBIN: 12.5 GM/DL (ref 14–18)
INR BLD: 1.14 (ref 0.85–1.13)
LIPASE: 34.4 U/L (ref 5.6–51.3)
LYMPHOCYTES # BLD: 4.8 %
LYMPHOCYTES ABSOLUTE: 0.7 THOU/MM3 (ref 1–4.8)
MAGNESIUM: 1.9 MG/DL (ref 1.6–2.4)
MCH RBC QN AUTO: 30.3 PG (ref 27–31)
MCHC RBC AUTO-ENTMCNC: 35.4 GM/DL (ref 33–37)
MCV RBC AUTO: 85.5 FL (ref 80–94)
MONOCYTES # BLD: 5.6 %
MONOCYTES ABSOLUTE: 0.8 THOU/MM3 (ref 0.4–1.3)
NUCLEATED RED BLOOD CELLS: 0 /100 WBC
OSMOLALITY CALCULATION: 280.3 MOSMOL/KG (ref 275–300)
PDW BLD-RTO: 17.1 % (ref 11.5–14.5)
PLATELET # BLD: 156 THOU/MM3 (ref 130–400)
PMV BLD AUTO: 9 FL (ref 7.4–10.4)
POTASSIUM SERPL-SCNC: 3.7 MEQ/L (ref 3.5–5.2)
RBC # BLD: 4.13 MILL/MM3 (ref 4.7–6.1)
SALICYLATE, SERUM: < 0.3 MG/DL (ref 2–10)
SEG NEUTROPHILS: 88.9 %
SEGMENTED NEUTROPHILS ABSOLUTE COUNT: 12.8 THOU/MM3 (ref 1.8–7.7)
SODIUM BLD-SCNC: 140 MEQ/L (ref 135–145)
TOTAL PROTEIN: 6.8 G/DL (ref 6.1–8)
TROPONIN T: < 0.01 NG/ML
WBC # BLD: 14.4 THOU/MM3 (ref 4.8–10.8)

## 2018-05-21 PROCEDURE — 76705 ECHO EXAM OF ABDOMEN: CPT

## 2018-05-21 PROCEDURE — 83735 ASSAY OF MAGNESIUM: CPT

## 2018-05-21 PROCEDURE — 99284 EMERGENCY DEPT VISIT MOD MDM: CPT

## 2018-05-21 PROCEDURE — 85025 COMPLETE CBC W/AUTO DIFF WBC: CPT

## 2018-05-21 PROCEDURE — G0480 DRUG TEST DEF 1-7 CLASSES: HCPCS

## 2018-05-21 PROCEDURE — 36415 COLL VENOUS BLD VENIPUNCTURE: CPT

## 2018-05-21 PROCEDURE — 6360000002 HC RX W HCPCS: Performed by: EMERGENCY MEDICINE

## 2018-05-21 PROCEDURE — 85610 PROTHROMBIN TIME: CPT

## 2018-05-21 PROCEDURE — 82248 BILIRUBIN DIRECT: CPT

## 2018-05-21 PROCEDURE — 83690 ASSAY OF LIPASE: CPT

## 2018-05-21 PROCEDURE — 84484 ASSAY OF TROPONIN QUANT: CPT

## 2018-05-21 PROCEDURE — 71046 X-RAY EXAM CHEST 2 VIEWS: CPT

## 2018-05-21 PROCEDURE — 93005 ELECTROCARDIOGRAM TRACING: CPT | Performed by: EMERGENCY MEDICINE

## 2018-05-21 PROCEDURE — 85730 THROMBOPLASTIN TIME PARTIAL: CPT

## 2018-05-21 PROCEDURE — 80053 COMPREHEN METABOLIC PANEL: CPT

## 2018-05-21 PROCEDURE — 6370000000 HC RX 637 (ALT 250 FOR IP): Performed by: EMERGENCY MEDICINE

## 2018-05-21 RX ORDER — ONDANSETRON 4 MG/1
4 TABLET, ORALLY DISINTEGRATING ORAL ONCE
Status: COMPLETED | OUTPATIENT
Start: 2018-05-21 | End: 2018-05-21

## 2018-05-21 RX ORDER — PANTOPRAZOLE SODIUM 40 MG/1
40 TABLET, DELAYED RELEASE ORAL ONCE
Status: COMPLETED | OUTPATIENT
Start: 2018-05-21 | End: 2018-05-21

## 2018-05-21 RX ORDER — SUCRALFATE 1 G/1
1 TABLET ORAL ONCE
Status: COMPLETED | OUTPATIENT
Start: 2018-05-21 | End: 2018-05-21

## 2018-05-21 RX ADMIN — Medication 30 ML: at 22:19

## 2018-05-21 RX ADMIN — SUCRALFATE 1 G: 1 TABLET ORAL at 22:19

## 2018-05-21 RX ADMIN — ONDANSETRON 4 MG: 4 TABLET, ORALLY DISINTEGRATING ORAL at 22:19

## 2018-05-21 RX ADMIN — PANTOPRAZOLE SODIUM 40 MG: 40 TABLET, DELAYED RELEASE ORAL at 22:19

## 2018-05-21 ASSESSMENT — ENCOUNTER SYMPTOMS
CHEST TIGHTNESS: 0
CHOKING: 0
SHORTNESS OF BREATH: 1
VOMITING: 0
VOICE CHANGE: 0
EYE PAIN: 0
BLOOD IN STOOL: 0
SINUS PRESSURE: 0
EYE DISCHARGE: 0
EYE REDNESS: 0
EYE ITCHING: 0
BACK PAIN: 0
ABDOMINAL PAIN: 0
COUGH: 0
ABDOMINAL DISTENTION: 0
SORE THROAT: 0
NAUSEA: 0
WHEEZING: 0
TROUBLE SWALLOWING: 0
CONSTIPATION: 0
PHOTOPHOBIA: 0
RHINORRHEA: 0
DIARRHEA: 0

## 2018-05-22 ENCOUNTER — APPOINTMENT (OUTPATIENT)
Dept: CT IMAGING | Age: 38
End: 2018-05-22
Payer: COMMERCIAL

## 2018-05-22 VITALS
SYSTOLIC BLOOD PRESSURE: 137 MMHG | HEIGHT: 70 IN | RESPIRATION RATE: 16 BRPM | OXYGEN SATURATION: 96 % | BODY MASS INDEX: 28.06 KG/M2 | HEART RATE: 83 BPM | TEMPERATURE: 98.7 F | DIASTOLIC BLOOD PRESSURE: 88 MMHG | WEIGHT: 196 LBS

## 2018-05-22 LAB
AMPHETAMINE+METHAMPHETAMINE URINE SCREEN: NEGATIVE
BARBITURATE QUANTITATIVE URINE: NEGATIVE
BENZODIAZEPINE QUANTITATIVE URINE: NEGATIVE
BILIRUBIN URINE: NEGATIVE
BLOOD, URINE: NEGATIVE
CANNABINOID QUANTITATIVE URINE: POSITIVE
CHARACTER, URINE: CLEAR
COCAINE METABOLITE QUANTITATIVE URINE: NEGATIVE
COLOR: YELLOW
GLUCOSE URINE: NEGATIVE MG/DL
HAV IGM SER IA-ACNC: NEGATIVE
HEPATITIS B CORE IGM ANTIBODY: NEGATIVE
HEPATITIS B SURFACE ANTIGEN: NEGATIVE
HEPATITIS C ANTIBODY: NEGATIVE
KETONES, URINE: NEGATIVE
LEUKOCYTE ESTERASE, URINE: NEGATIVE
NITRITE, URINE: NEGATIVE
OPIATES, URINE: NEGATIVE
OXYCODONE: NEGATIVE
PH UA: 7.5
PHENCYCLIDINE QUANTITATIVE URINE: NEGATIVE
PROTEIN UA: NEGATIVE
SPECIFIC GRAVITY, URINE: 1.01 (ref 1–1.03)
UROBILINOGEN, URINE: 4 EU/DL

## 2018-05-22 PROCEDURE — 80307 DRUG TEST PRSMV CHEM ANLYZR: CPT

## 2018-05-22 PROCEDURE — 6360000004 HC RX CONTRAST MEDICATION: Performed by: EMERGENCY MEDICINE

## 2018-05-22 PROCEDURE — 74177 CT ABD & PELVIS W/CONTRAST: CPT

## 2018-05-22 PROCEDURE — 93010 ELECTROCARDIOGRAM REPORT: CPT | Performed by: INTERNAL MEDICINE

## 2018-05-22 PROCEDURE — 71275 CT ANGIOGRAPHY CHEST: CPT

## 2018-05-22 PROCEDURE — 36415 COLL VENOUS BLD VENIPUNCTURE: CPT

## 2018-05-22 PROCEDURE — 96374 THER/PROPH/DIAG INJ IV PUSH: CPT

## 2018-05-22 PROCEDURE — 80074 ACUTE HEPATITIS PANEL: CPT

## 2018-05-22 PROCEDURE — 6360000002 HC RX W HCPCS: Performed by: EMERGENCY MEDICINE

## 2018-05-22 PROCEDURE — 81003 URINALYSIS AUTO W/O SCOPE: CPT

## 2018-05-22 RX ORDER — AMOXICILLIN AND CLAVULANATE POTASSIUM 875; 125 MG/1; MG/1
1 TABLET, FILM COATED ORAL 2 TIMES DAILY
Qty: 20 TABLET | Refills: 0 | Status: SHIPPED | OUTPATIENT
Start: 2018-05-22 | End: 2018-06-01

## 2018-05-22 RX ORDER — PANTOPRAZOLE SODIUM 20 MG/1
20 TABLET, DELAYED RELEASE ORAL DAILY
Qty: 30 TABLET | Refills: 0 | Status: SHIPPED | OUTPATIENT
Start: 2018-05-22 | End: 2019-08-20 | Stop reason: ALTCHOICE

## 2018-05-22 RX ORDER — ONDANSETRON 4 MG/1
4 TABLET, FILM COATED ORAL EVERY 8 HOURS PRN
Qty: 20 TABLET | Refills: 0 | Status: SHIPPED | OUTPATIENT
Start: 2018-05-22 | End: 2019-08-20 | Stop reason: ALTCHOICE

## 2018-05-22 RX ORDER — HYDROCODONE BITARTRATE AND ACETAMINOPHEN 5; 325 MG/1; MG/1
1 TABLET ORAL EVERY 6 HOURS PRN
Qty: 15 TABLET | Refills: 0 | Status: SHIPPED | OUTPATIENT
Start: 2018-05-22 | End: 2018-05-25

## 2018-05-22 RX ORDER — MORPHINE SULFATE 4 MG/ML
4 INJECTION, SOLUTION INTRAMUSCULAR; INTRAVENOUS ONCE
Status: COMPLETED | OUTPATIENT
Start: 2018-05-22 | End: 2018-05-22

## 2018-05-22 RX ADMIN — IOPAMIDOL 80 ML: 755 INJECTION, SOLUTION INTRAVENOUS at 02:01

## 2018-05-22 RX ADMIN — MORPHINE SULFATE 4 MG: 4 INJECTION, SOLUTION INTRAMUSCULAR; INTRAVENOUS at 01:57

## 2018-05-22 ASSESSMENT — PAIN SCALES - GENERAL: PAINLEVEL_OUTOF10: 6

## 2018-06-11 ENCOUNTER — HOSPITAL ENCOUNTER (OUTPATIENT)
Dept: NUCLEAR MEDICINE | Age: 38
Discharge: HOME | End: 2018-06-11
Payer: COMMERCIAL

## 2018-06-11 VITALS — BODY MASS INDEX: 27.26 KG/M2 | WEIGHT: 190 LBS

## 2018-06-11 DIAGNOSIS — R10.11 RUQ PAIN: ICD-10-CM

## 2018-06-11 PROCEDURE — 6360000004 HC RX CONTRAST MEDICATION: Performed by: RADIOLOGY

## 2018-06-11 PROCEDURE — 2580000003 HC RX 258: Performed by: RADIOLOGY

## 2018-06-11 PROCEDURE — 78227 HEPATOBIL SYST IMAGE W/DRUG: CPT

## 2018-06-11 PROCEDURE — 3430000000 HC RX DIAGNOSTIC RADIOPHARMACEUTICAL: Performed by: NURSE PRACTITIONER

## 2018-06-11 PROCEDURE — A9537 TC99M MEBROFENIN: HCPCS | Performed by: NURSE PRACTITIONER

## 2018-06-11 RX ADMIN — MEBROFENIN 8.6 MILLICURIE: 45 INJECTION, POWDER, LYOPHILIZED, FOR SOLUTION INTRAVENOUS at 13:10

## 2018-06-11 RX ADMIN — SODIUM CHLORIDE 1.72 MCG: 9 INJECTION, SOLUTION INTRAVENOUS at 14:15

## 2019-08-20 ENCOUNTER — HOSPITAL ENCOUNTER (EMERGENCY)
Age: 39
Discharge: HOME | End: 2019-08-20
Payer: COMMERCIAL

## 2019-08-20 VITALS
BODY MASS INDEX: 27.2 KG/M2 | HEART RATE: 94 BPM | OXYGEN SATURATION: 98 % | DIASTOLIC BLOOD PRESSURE: 86 MMHG | SYSTOLIC BLOOD PRESSURE: 148 MMHG | WEIGHT: 190 LBS | RESPIRATION RATE: 16 BRPM | HEIGHT: 70 IN | TEMPERATURE: 98.2 F

## 2019-08-20 DIAGNOSIS — R10.9 RIGHT FLANK PAIN: Primary | ICD-10-CM

## 2019-08-20 DIAGNOSIS — R30.0 DYSURIA: ICD-10-CM

## 2019-08-20 LAB
BILIRUBIN URINE: NEGATIVE
BLOOD, URINE: NEGATIVE
CHARACTER, URINE: CLEAR
COLOR: YELLOW
GLUCOSE, URINE: NEGATIVE MG/DL
KETONES, URINE: NEGATIVE
LEUKOCYTES, UA: NEGATIVE
NITRITE, URINE: NEGATIVE
PH UA: 7 (ref 5–9)
PROTEIN UA: NEGATIVE MG/DL
REFLEX TO URINE C & S: NORMAL
SPECIFIC GRAVITY UA: 1.01 (ref 1–1.03)
UROBILINOGEN, URINE: 0.2 EU/DL (ref 0–1)

## 2019-08-20 PROCEDURE — 81003 URINALYSIS AUTO W/O SCOPE: CPT

## 2019-08-20 PROCEDURE — 99203 OFFICE O/P NEW LOW 30 MIN: CPT | Performed by: NURSE PRACTITIONER

## 2019-08-20 PROCEDURE — 99214 OFFICE O/P EST MOD 30 MIN: CPT

## 2019-08-20 RX ORDER — NAPROXEN SODIUM 220 MG
540 TABLET ORAL 2 TIMES DAILY PRN
COMMUNITY
End: 2022-01-03

## 2019-08-20 SDOH — HEALTH STABILITY: MENTAL HEALTH: HOW OFTEN DO YOU HAVE A DRINK CONTAINING ALCOHOL?: NEVER

## 2019-08-20 ASSESSMENT — PAIN DESCRIPTION - ONSET: ONSET: PROGRESSIVE

## 2019-08-20 ASSESSMENT — PAIN DESCRIPTION - DESCRIPTORS: DESCRIPTORS: THROBBING

## 2019-08-20 ASSESSMENT — PAIN DESCRIPTION - LOCATION: LOCATION: FLANK

## 2019-08-20 ASSESSMENT — ENCOUNTER SYMPTOMS
NAUSEA: 0
ABDOMINAL PAIN: 0
VOMITING: 0
COUGH: 0
DIARRHEA: 0
SHORTNESS OF BREATH: 0

## 2019-08-20 ASSESSMENT — PAIN DESCRIPTION - PROGRESSION: CLINICAL_PROGRESSION: GRADUALLY WORSENING

## 2019-08-20 ASSESSMENT — PAIN DESCRIPTION - ORIENTATION: ORIENTATION: RIGHT

## 2019-08-20 ASSESSMENT — PAIN DESCRIPTION - FREQUENCY: FREQUENCY: INTERMITTENT

## 2019-08-20 ASSESSMENT — PAIN - FUNCTIONAL ASSESSMENT: PAIN_FUNCTIONAL_ASSESSMENT: PREVENTS OR INTERFERES SOME ACTIVE ACTIVITIES AND ADLS

## 2019-08-20 ASSESSMENT — PAIN DESCRIPTION - PAIN TYPE: TYPE: ACUTE PAIN

## 2019-08-20 ASSESSMENT — PAIN SCALES - GENERAL: PAINLEVEL_OUTOF10: 6

## 2019-08-20 NOTE — ED PROVIDER NOTES
[address]  Urgent Care Encounter       CHIEF COMPLAINT       Chief Complaint   Patient presents with    Flank Pain     right sided , cloudy urine and burning in the urethra after voiding x 1 week        Nurses Notes reviewed and I agree except as noted in the HPI. HISTORY OF PRESENT ILLNESS   Mehnaz Ruggiero is a 34 y.o. male who presents with complaints of right flank pain x1 week. Patient also reports dysuria after urinating but not during. He also reports some increased frequency. Pain to the right flank is fairly constant rated a 6 out of 10. It does decrease in intensity at times but is always present. Pain occasionally radiates into the right side area. There are no aggravating or alleviating factors. No reports of fever but does report occasional chills. No nausea or vomiting or headaches reported. The history is provided by the patient. REVIEW OF SYSTEMS     Review of Systems   Constitutional: Positive for chills. Negative for appetite change, fatigue and fever. Respiratory: Negative for cough and shortness of breath. Cardiovascular: Negative for chest pain. Gastrointestinal: Negative for abdominal pain, diarrhea, nausea and vomiting. Genitourinary: Positive for dysuria, flank pain and frequency. Negative for hematuria, testicular pain and urgency. Musculoskeletal:        See HPI   Skin: Negative for wound. Neurological: Negative for dizziness, numbness and headaches. PAST MEDICAL HISTORY   History reviewed. No pertinent past medical history. SURGICALHISTORY     Patient  has a past surgical history that includes cyst removal; Tonsillectomy and adenoidectomy; [place] tooth extraction; pr office/outpt visit,procedure only (Left, 4/13/2018); and fracture surgery (Left, 2018).     CURRENT MEDICATIONS       Discharge Medication List as of 8/20/2019  9:49 AM      CONTINUE these medications which have NOT CHANGED    Details   naproxen sodium (ALEVE) pH, UA 7.00 5.0 - 9.0    Protein, UA Negative NEGATIVE mg/dl    Urobilinogen, Urine 0.20 0.0 - 1.0 eu/dl    Nitrite, Urine Negative NEGATIVE    LEUKOCYTES, UA Negative NEGATIVE    Color, UA Yellow STRAW-YELL    Character, Urine Clear CLEAR-SL C    REFLEX TO URINE C & S NOT INDICATED        IMAGING:    No orders to display         URGENT CARE COURSE:     Vitals:    08/20/19 0846   BP: (!) 148/86   Pulse: 94   Resp: 16   Temp: 98.2 °F (36.8 °C)   TempSrc: Temporal   SpO2: 98%   Weight: 190 lb (86.2 kg)   Height: 5' 10\" (1.778 m)       Medications - No data to display         PROCEDURES:  None    FINAL IMPRESSION      1. Right flank pain    2. Dysuria          DISPOSITION/ PLAN     Patient presents with complaints of right sided flank pain. UA was completely normal.  Discussed completing a CT of the abdomen and pelvis to rule out kidney stone even without hematuria. Patient decided that he would wait and monitor the pain at this time. Patient instructed to follow-up in the next 2 to 3 days if pain continues or go to the ER for worsening condition as discussed. Further instructions were outlined verbally and in the patient's discharge instructions. All the patient's questions were answered. The patient/parent agreed with the plan and was discharged from the [facility] in good condition.       PATIENT REFERRED TO:  Tram Cobian MD  [address] [place] [address] / [facility] [id number]      DISCHARGE MEDICATIONS:  Discharge Medication List as of 8/20/2019  9:49 AM          Discharge Medication List as of 8/20/2019  9:49 AM          Discharge Medication List as of 8/20/2019  9:49 AM          PARUL Quintana CNP    (Please note that portions of this note were completed with a voice recognition program. Efforts were made to edit the dictations but occasionally words are mis-transcribed.)         PARUL Quintana CNP  08/20/19 PARUL Fisher CNP  08/20/19 8028

## 2020-05-21 ENCOUNTER — OFFICE VISIT (OUTPATIENT)
Dept: FAMILY MEDICINE CLINIC | Age: 40
End: 2020-05-21

## 2020-05-21 VITALS
SYSTOLIC BLOOD PRESSURE: 138 MMHG | TEMPERATURE: 97.4 F | HEART RATE: 80 BPM | BODY MASS INDEX: 27.09 KG/M2 | WEIGHT: 193.5 LBS | DIASTOLIC BLOOD PRESSURE: 72 MMHG | RESPIRATION RATE: 14 BRPM | HEIGHT: 71 IN

## 2020-05-21 PROCEDURE — 99396 PREV VISIT EST AGE 40-64: CPT | Performed by: FAMILY MEDICINE

## 2020-05-21 RX ORDER — KETOROLAC TROMETHAMINE 10 MG/1
10 TABLET, FILM COATED ORAL EVERY 6 HOURS PRN
Qty: 20 TABLET | Refills: 0 | Status: SHIPPED | OUTPATIENT
Start: 2020-05-21 | End: 2022-01-03

## 2020-05-21 ASSESSMENT — ENCOUNTER SYMPTOMS
SINUS PRESSURE: 0
CONSTIPATION: 0
SHORTNESS OF BREATH: 0

## 2022-01-03 ENCOUNTER — HOSPITAL ENCOUNTER (EMERGENCY)
Age: 42
Discharge: HOME | End: 2022-01-03
Payer: COMMERCIAL

## 2022-01-03 VITALS
HEART RATE: 107 BPM | TEMPERATURE: 98.4 F | SYSTOLIC BLOOD PRESSURE: 122 MMHG | RESPIRATION RATE: 16 BRPM | DIASTOLIC BLOOD PRESSURE: 78 MMHG | OXYGEN SATURATION: 98 %

## 2022-01-03 DIAGNOSIS — U07.1 COVID-19 VIRUS INFECTION: Primary | ICD-10-CM

## 2022-01-03 LAB
FLU A ANTIGEN: NEGATIVE
FLU B ANTIGEN: NEGATIVE
SARS-COV-2, NAA: DETECTED

## 2022-01-03 PROCEDURE — 87804 INFLUENZA ASSAY W/OPTIC: CPT

## 2022-01-03 PROCEDURE — 99213 OFFICE O/P EST LOW 20 MIN: CPT | Performed by: NURSE PRACTITIONER

## 2022-01-03 PROCEDURE — 87635 SARS-COV-2 COVID-19 AMP PRB: CPT

## 2022-01-03 PROCEDURE — 99213 OFFICE O/P EST LOW 20 MIN: CPT

## 2022-01-03 ASSESSMENT — PAIN SCALES - GENERAL: PAINLEVEL_OUTOF10: 8

## 2022-01-03 ASSESSMENT — ENCOUNTER SYMPTOMS
VOMITING: 0
SHORTNESS OF BREATH: 0
COUGH: 1
SINUS PRESSURE: 1
SORE THROAT: 0
NAUSEA: 0
DIARRHEA: 0

## 2022-01-03 ASSESSMENT — PAIN DESCRIPTION - LOCATION: LOCATION: HEAD;GENERALIZED

## 2022-01-03 ASSESSMENT — PAIN DESCRIPTION - PAIN TYPE: TYPE: ACUTE PAIN

## 2022-01-03 NOTE — ED TRIAGE NOTES
Lul Yanez arrives to room with complaint of cough congestion headache fatigue generalized body aches chills symptoms started 5 days ago. Symptoms have worsened over the last 24 hours significantly.

## 2022-01-03 NOTE — Clinical Note
Brennon North was seen and treated in our emergency department on 1/3/2022. COVID19 virus is suspected. Per the CDC guidelines we recommend home isolation until the following conditions are all met:    1. At least 10 days have passed since symptoms first appeared and  2. At least 24 hours have passed since last fever without the use of fever-reducing medications and  3. Symptoms (e.g., cough, shortness of breath) have improved    If you have any questions or concerns, please don't hesitate to call. He may return to work/school on 01/06/2022    Quarantine for 5 days from onset of symptoms. Can and quarantine at 5 days if no fever in the previous 24 hours without the use of Tylenol and or Motrin and if symptoms are resolved or nearly resolved. If symptoms continue extend quarantine until the above criteria are met.     Keri Dominique Case, APRN - CNP

## 2022-01-03 NOTE — ED PROVIDER NOTES
[address]  Urgent Care Encounter       CHIEF COMPLAINT       Chief Complaint   Patient presents with    Cough    Nasal Congestion    Headache    Fatigue    Chills       Nurses Notes reviewed and I agree except as noted in the HPI. HISTORY OF PRESENT ILLNESS   Eli Heller is a 35 y.o. male who presents with complaints of fatigue, chills and cough, onset 2 days ago. Patient also with nasal and sinus congestion and headaches. Reports a low-grade temperature. No nausea, vomiting or diarrhea. No known sick contacts. He is not vaccinated for Covid. The history is provided by the patient. REVIEW OF SYSTEMS     Review of Systems   Constitutional: Positive for chills, fatigue and fever. HENT: Positive for congestion and sinus pressure. Negative for ear pain and sore throat. Respiratory: Positive for cough. Negative for shortness of breath. Cardiovascular: Negative for chest pain. Gastrointestinal: Negative for diarrhea, nausea and vomiting. Musculoskeletal: Negative for myalgias. Skin: Negative for rash. Neurological: Positive for headaches. PAST MEDICAL HISTORY   History reviewed. No pertinent past medical history. SURGICALHISTORY     Patient  has a past surgical history that includes cyst removal; Tonsillectomy and adenoidectomy; [place] tooth extraction; pr office/outpt visit,procedure only (Left, 4/13/2018); and fracture surgery (Left, 2018). CURRENT MEDICATIONS       Discharge Medication List as of 1/3/2022 11:53 AM          ALLERGIES     Patient is has No Known Allergies. Patients   Immunization History   Administered Date(s) Administered    Tdap (Boostrix, Adacel) 08/12/2016       FAMILY HISTORY     Patient's family history includes Heart Disease in his brother; No Known Problems in his father and mother. SOCIAL HISTORY     Patient  reports that he has quit smoking. He has never used smokeless tobacco. He reports previous alcohol use. He reports that he does not use drugs. PHYSICAL EXAM     ED TRIAGE VITALS  BP: 122/78, Temp: 98.4 °F (36.9 °C), Pulse: 107, Resp: 16, SpO2: 98 %,Estimated body mass index is 26.99 kg/m² as calculated from the following:    Height as of 5/21/20: 5' 11\" (1.803 m). Weight as of 5/21/20: 193 lb 8 oz (87.8 kg). ,No LMP for male patient. Physical Exam  Vitals and nursing note reviewed. Constitutional:       General: He is not in acute distress. Appearance: He is well-developed. He is not ill-appearing. HENT:      Head: Normocephalic and atraumatic. Right Ear: Tympanic membrane and ear canal normal.      Left Ear: Tympanic membrane and ear canal normal.      Nose: Congestion present. Eyes:      General: Lids are normal. No scleral icterus. Conjunctiva/sclera: Conjunctivae normal.      Pupils: Pupils are equal.   Cardiovascular:      Rate and Rhythm: Normal rate and regular rhythm. Heart sounds: Normal heart sounds, S1 normal and S2 normal.   Pulmonary:      Effort: Pulmonary effort is normal. No respiratory distress. Breath sounds: Normal breath sounds. Musculoskeletal:      Comments: Normal active ROM x 4 extremities  Gait steady   Lymphadenopathy:      Comments: No head or neck adenopathy   Skin:     General: Skin is warm and dry. Findings: No rash (to exposed skin). Neurological:      General: No focal deficit present. Mental Status: He is alert and oriented to person, place, and time. Sensory: No sensory deficit. Comments: Answers questions readily and appropriately   Psychiatric:         Mood and Affect: Mood normal.         Speech: Speech normal.         Behavior: Behavior normal. Behavior is cooperative.          DIAGNOSTIC RESULTS     Labs:  Results for orders placed or performed during the hospital encounter of 01/03/22   COVID-19, Rapid   Result Value Ref Range    SARS-CoV-2, FARIDA DETECTED (AA) NOT DETECTED   Rapid influenza A/B antigens   Result Value Ref Range    Flu A Antigen Negative NEGATIVE    Flu B Antigen Negative NEGATIVE       IMAGING:    No orders to display         EKG:      URGENT CARE COURSE:     Vitals:    01/03/22 1058   BP: 122/78   Pulse: 107   Resp: 16   Temp: 98.4 °F (36.9 °C)   TempSrc: Temporal   SpO2: 98%       Medications - No data to display         PROCEDURES:  None    FINAL IMPRESSION      1. COVID-19 virus infection          DISPOSITION/ PLAN     Patient tested positive for COVID-19. Quarantine for 5days from onset if symptoms have resolved and no fever. Wear a mask for 5 days after the initial 5 days. If symptoms continue or have a fever, quarantine for 10 days from onset of symptoms. If symptoms or fever remain after 10 days, continue quarantine for total of 14 days. Can use over-the-counter medication as desired for symptom management. Can start Vitamin C, D3, and Zinc if desired. Tylenol or Motrin for body aches and fever. Imodium as needed for diarrhea. Drink plenty fluids to maintain good hydration. Wear a mask around others in the home. Good frequent handwashing. Social distancing. Can contact the health department or your family doctor with any questions or concerns. ER for any difficulty breathing or any other concerning symptoms. Further instructions were outlined verbally and in the patient's discharge instructions. All the patient's questions were answered. The patient/parent agreed with the plan and was discharged from the [facility] in good condition.       PATIENT REFERRED TO:  Ar Pelletier MD  [address] [place] [address] / [address] [place] [address] Road [facility]      DISCHARGE MEDICATIONS:  Discharge Medication List as of 1/3/2022 11:53 AM          Discharge Medication List as of 1/3/2022 11:53 AM      STOP taking these medications       ketorolac (TORADOL) 10 MG tablet Comments:   Reason for Stopping:         naproxen sodium (ALEVE) 220 MG tablet Comments:   Reason for Stopping:               Discharge Medication List as of 1/3/2022 11:53 AM          PARUL Avila CNP    (Please note that portions of this note were completed with a voice recognition program. Efforts were made to edit the dictations but occasionally words are mis-transcribed.)         PARUL Avila CNP  01/03/22 1154

## 2022-01-04 ENCOUNTER — CARE COORDINATION (OUTPATIENT)
Dept: CARE COORDINATION | Age: 42
End: 2022-01-04

## 2022-01-04 NOTE — CARE COORDINATION
Patient was called to follow up with most recent ER/UC visit. There was no answer. A message was left on voicemail to have patient call back regarding. Office number given [phone].

## 2022-01-05 NOTE — CARE COORDINATION
2nd attempt to reach patient. There was no answer. A message was left to have patient call back. Office number left. [phone].

## 2022-01-11 ENCOUNTER — VIRTUAL VISIT (OUTPATIENT)
Dept: FAMILY MEDICINE CLINIC | Age: 42
End: 2022-01-11

## 2022-01-11 DIAGNOSIS — J06.9 UPPER RESPIRATORY TRACT INFECTION DUE TO COVID-19 VIRUS: ICD-10-CM

## 2022-01-11 DIAGNOSIS — F17.200 SMOKER: ICD-10-CM

## 2022-01-11 DIAGNOSIS — U07.1 UPPER RESPIRATORY TRACT INFECTION DUE TO COVID-19 VIRUS: ICD-10-CM

## 2022-01-11 DIAGNOSIS — J01.90 ACUTE SINUSITIS, RECURRENCE NOT SPECIFIED, UNSPECIFIED LOCATION: Primary | ICD-10-CM

## 2022-01-11 PROCEDURE — 99213 OFFICE O/P EST LOW 20 MIN: CPT | Performed by: EMERGENCY MEDICINE

## 2022-01-11 RX ORDER — CEFUROXIME AXETIL 500 MG/1
500 TABLET ORAL 2 TIMES DAILY
Qty: 20 TABLET | Refills: 0 | Status: SHIPPED | OUTPATIENT
Start: 2022-01-11 | End: 2022-01-21

## 2022-01-11 RX ORDER — VARENICLINE TARTRATE
KIT
Qty: 1 BOX | Refills: 0 | Status: SHIPPED | OUTPATIENT
Start: 2022-01-11

## 2022-01-11 ASSESSMENT — ENCOUNTER SYMPTOMS
CHEST TIGHTNESS: 0
SINUS PRESSURE: 1
BACK PAIN: 0
CONSTIPATION: 0
VOICE CHANGE: 0
DIARRHEA: 0
ABDOMINAL PAIN: 0
RHINORRHEA: 0
SINUS PAIN: 1
SORE THROAT: 0
NAUSEA: 0
COUGH: 1
SHORTNESS OF BREATH: 0
TROUBLE SWALLOWING: 0
VOMITING: 0
WHEEZING: 0

## 2022-01-11 NOTE — PROGRESS NOTES
Arvind Cobos agreed to Video Chat in presence of Dr Shakira Arriaza and myself. Verified who was present in room with Cesar. Cesar informed the e-mail address used to Face Time cannot be used to contact the Provider, if they are any questions or concerns they need to call the office directly. Arvind Cobos stated understanding.

## 2022-01-11 NOTE — PROGRESS NOTES
Telemedicine visit :  Date: 1/11/2022     Patient verified Video Chat was not encrypted, and agrees to the Video Exam in presence of nurse. Roula Skelton is a 35 y.o.male who presents today  Chief Complaint   Patient presents with    Other     covid + on 1/3/2022,         HPI:     HPI    Patient was seen via telemedicine, face time with his/her phone. patient started to have sinus congestion ear pain 12/26/2021 which persisted    1/3/22 patient was seen at the urgent care + Covid test was positive was laid off for 5 days, working with construction now    Patient's been having yellowish-green sinus drainage with a lot of sinus pressure coughing is much better no shortness of breath no difficulty breathing, no chest pain no abdominal pain diarrhea    Yesterday Home Covid test was negative    Want to quit smoking asking about  Chantix    CurrentHome Medications:  Current Outpatient Medications   Medication Sig Dispense Refill    cefUROXime (CEFTIN) 500 MG tablet Take 1 tablet by mouth 2 times daily for 10 days 20 tablet 0    varenicline (CHANTIX STARTING MONTH PAK) 0.5 MG X 11 & 1 MG X 42 tablet Take by mouth. 1 box 0     No current facility-administered medications for this visit. Subjective:      Review of Systems   Constitutional: Negative for appetite change, chills, diaphoresis, fatigue and fever. HENT: Positive for congestion, sinus pressure and sinus pain. Negative for ear discharge, ear pain, postnasal drip, rhinorrhea, sore throat, trouble swallowing and voice change. Respiratory: Positive for cough. Negative for chest tightness, shortness of breath and wheezing. Cardiovascular: Negative for chest pain, palpitations and leg swelling. Gastrointestinal: Negative for abdominal pain, constipation, diarrhea, nausea and vomiting. Musculoskeletal: Negative for arthralgias, back pain, joint swelling, myalgias, neck pain and neck stiffness. Skin: Negative for rash.    Neurological: Negative for dizziness, syncope, weakness, light-headedness, numbness and headaches. Objective: There were no vitals taken for this visit. BP Readings from Last 3 Encounters:   01/03/22 122/78   05/21/20 138/72   08/20/19 (!) 148/86     Wt Readings from Last 3 Encounters:   05/21/20 193 lb 8 oz (87.8 kg)   08/20/19 190 lb (86.2 kg)   06/11/18 190 lb (86.2 kg)       Physical Exam    Physical Examination:  not done, this is a telemedicine  Patient is looking alert, active, cooperative , no difficulty of breathing    Assessment:         Diagnosis Orders   1. Acute sinusitis, recurrence not specified, unspecified location     2. Upper respiratory tract infection due to COVID-19 virus improving     3. Smoker         :      Medications Prescribed:  Orders Placed This Encounter   Medications    cefUROXime (CEFTIN) 500 MG tablet     Sig: Take 1 tablet by mouth 2 times daily for 10 days     Dispense:  20 tablet     Refill:  0    varenicline (CHANTIX STARTING MONTH PAK) 0.5 MG X 11 & 1 MG X 42 tablet     Sig: Take by mouth. Dispense:  1 box     Refill:  0     Orders Placed:  No orders of the defined types were placed in this encounter. Telemedicine time : 15 minutes    Discussed about COVID 19 , viral upper respiratory infection, signs and symptoms, talk about they are high risks, hygiene, washing hands, use of mask, covering mouth when coughing, avoid traveling especially airplane, cruise, conference, seminars, attending masses , avoid exposure to children and Millennials. Encouraged the patient to call the office is having more concerned. Advised to hydrate himself well, eat balance diet, over-the-counter vitamin C vitamin D, zinc and melatonin over-the-counter    Call or Return if symptoms worsen or fail to improve. Discussed use, benefit, and side effects of prescribedmedications. All patient questions answered. Pt voiced understanding.   Instructedto continue current medications, diet and exercise. Patient agreed with treatmentplan. Xochitl Wilson was evaluated through a synchronous (real-time) audio-video encounter. The patient (or guardian if applicable) is aware that this is a billable service. Verbal consent to proceed has been obtained within the past 12 months. The visit was conducted pursuant to the emergency declaration under the [address] [place] [address], [address] [place] [address] and the [facility] and [employer] Act. Patient identification was verified, and a caregiver was present when appropriate. The patient was located in a state where the provider was credentialed to provide care.

## 2022-01-13 ENCOUNTER — TELEPHONE (OUTPATIENT)
Dept: FAMILY MEDICINE CLINIC | Age: 42
End: 2022-01-13

## 2022-01-13 RX ORDER — BUPROPION HYDROCHLORIDE 150 MG/1
150 TABLET, EXTENDED RELEASE ORAL 2 TIMES DAILY
Qty: 60 TABLET | Refills: 5 | Status: SHIPPED | OUTPATIENT
Start: 2022-01-13

## 2022-01-13 NOTE — TELEPHONE ENCOUNTER
Let him know that I can send a Rx for some zyban. It is an antidepressant that is approved for use in helping quit smoking. It works the the temptation/ willpower/ impulse control area of the brain. It is safe to smoke while on the zyban.

## 2022-01-13 NOTE — TELEPHONE ENCOUNTER
Pt called he saw Dr Anca Glass yesterday for a video appt and he prescribed Chantix for him. We are finding out that Chantix has been taken off the market so pt is wanting to know if you would give him anything to help him stop smoking?       [facility]

## 2023-04-04 ENCOUNTER — HOSPITAL ENCOUNTER (EMERGENCY)
Age: 43
Discharge: HOME | End: 2023-04-04
Payer: COMMERCIAL

## 2023-04-04 VITALS
TEMPERATURE: 98.6 F | SYSTOLIC BLOOD PRESSURE: 142 MMHG | DIASTOLIC BLOOD PRESSURE: 90 MMHG | RESPIRATION RATE: 16 BRPM | HEART RATE: 116 BPM | OXYGEN SATURATION: 98 %

## 2023-04-04 DIAGNOSIS — J40 BRONCHITIS: Primary | ICD-10-CM

## 2023-04-04 LAB
FLUAV AG SPEC QL: NEGATIVE
FLUBV AG SPEC QL: NEGATIVE
S PYO AG THROAT QL: NEGATIVE
SARS-COV-2 RDRP RESP QL NAA+PROBE: NOT  DETECTED

## 2023-04-04 PROCEDURE — 87635 SARS-COV-2 COVID-19 AMP PRB: CPT

## 2023-04-04 PROCEDURE — 87651 STREP A DNA AMP PROBE: CPT

## 2023-04-04 PROCEDURE — 99213 OFFICE O/P EST LOW 20 MIN: CPT | Performed by: NURSE PRACTITIONER

## 2023-04-04 PROCEDURE — 87804 INFLUENZA ASSAY W/OPTIC: CPT

## 2023-04-04 RX ORDER — PREDNISONE 20 MG/1
20 TABLET ORAL 2 TIMES DAILY
Qty: 10 TABLET | Refills: 0 | Status: SHIPPED | OUTPATIENT
Start: 2023-04-04 | End: 2023-04-09

## 2023-04-04 RX ORDER — BENZONATATE 200 MG/1
200 CAPSULE ORAL 3 TIMES DAILY PRN
Qty: 30 CAPSULE | Refills: 0 | Status: SHIPPED | OUTPATIENT
Start: 2023-04-04 | End: 2023-04-11

## 2023-04-04 RX ORDER — AMOXICILLIN AND CLAVULANATE POTASSIUM 875; 125 MG/1; MG/1
1 TABLET, FILM COATED ORAL 2 TIMES DAILY
Qty: 14 TABLET | Refills: 0 | Status: SHIPPED | OUTPATIENT
Start: 2023-04-04 | End: 2023-04-11

## 2023-04-04 ASSESSMENT — ENCOUNTER SYMPTOMS
DIARRHEA: 0
SORE THROAT: 1
CHEST TIGHTNESS: 1
SHORTNESS OF BREATH: 0
VOMITING: 0
COUGH: 1
NAUSEA: 0
RHINORRHEA: 1

## 2023-04-04 NOTE — ED PROVIDER NOTES
Cervical back: Normal range of motion. Neurological:      General: No focal deficit present. Mental Status: He is alert and oriented to person, place, and time. Psychiatric:         Mood and Affect: Mood normal.         Behavior: Behavior normal.       DIAGNOSTIC RESULTS     Labs:  Results for orders placed or performed during the hospital encounter of 04/04/23   Strep Screen Group A Throat   Result Value Ref Range    Rapid Strep A Screen NEGATIVE    COVID-19, Rapid   Result Value Ref Range    SARS-CoV-2, FARIDA NOT  DETECTED NOT DETECTED   Rapid influenza A/B antigens   Result Value Ref Range    Flu A Antigen Negative NEGATIVE    Flu B Antigen Negative NEGATIVE       IMAGING:    No orders to display         EKG: None      URGENT CARE COURSE:     Vitals:    04/04/23 0957   BP: (!) 142/90   Pulse: (!) 116   Resp: 16   Temp: 98.6 °F (37 °C)   SpO2: 98%       Medications - No data to display         PROCEDURES:  None    FINAL IMPRESSION      1. Bronchitis          DISPOSITION/ PLAN     Patient seen and evaluated for the above symptoms. Assessment consistent with acute bronchitis. Patient is provided a prescription for prednisone, Tessalon Perles, and Augmentin. Instructed use over-the-counter Tylenol and Motrin for pain or fever. Instructed follow-up with PCP in 3 to 5 days worsening symptom. Instructed to present to the emergency department for severe dyspnea or other conditions deemed emergent. Patient is agreeable with the above plan and denies questions or concerns at this time.         PATIENT REFERRED TO:  Megan Mancia MD  [address] [place] [address] / [facility] [id number]      DISCHARGE MEDICATIONS:  Discharge Medication List as of 4/4/2023 10:41 AM        START taking these medications    Details   amoxicillin-clavulanate (AUGMENTIN) 875-125 MG per tablet Take 1 tablet by mouth 2 times daily for 7 days, Disp-14 tablet, R-0Normal      predniSONE (DELTASONE) 20 MG tablet Take 1 tablet by mouth 2 times daily

## 2023-04-04 NOTE — ED NOTES
To [facility] with complaints of chills/hot, congestion, body aches, cough. Doesn't want tested for anything. Thinks he may have a bacterial infection.  Missed work yesterday and today     Yadira Moyer RN  04/04/23 0092

## 2023-04-04 NOTE — Clinical Note
Carmelita Ahn was seen and treated in our emergency department on 4/4/2023. He may return to work on 04/06/2023. May be off school or work one to two days if needed. Should also be fever free 24 hours before returning to work or school. If you have any questions or concerns, please don't hesitate to call.       Esperanza Carlin, APRN - CNP

## 2023-04-04 NOTE — ED NOTES
PT GIVEN DISCHARGE INSTRUCTIONS, VERBALIZES UNDERSTANDING. PT ASSESSMENT UNCHANGED, DISCHARGED IN STABLE CONDITION.          Suzie Aceves RN  04/04/23 2360

## 2024-11-14 ENCOUNTER — HOSPITAL ENCOUNTER (EMERGENCY)
Age: 44
Discharge: HOME | End: 2024-11-14
Attending: EMERGENCY MEDICINE
Payer: COMMERCIAL

## 2024-11-14 VITALS
RESPIRATION RATE: 16 BRPM | HEART RATE: 98 BPM | HEIGHT: 70 IN | WEIGHT: 195 LBS | OXYGEN SATURATION: 98 % | TEMPERATURE: 98.1 F | BODY MASS INDEX: 27.92 KG/M2

## 2024-11-14 DIAGNOSIS — S01.81XA FACIAL LACERATION, INITIAL ENCOUNTER: Primary | ICD-10-CM

## 2024-11-14 DIAGNOSIS — S09.90XA CLOSED HEAD INJURY, INITIAL ENCOUNTER: ICD-10-CM

## 2024-11-14 PROCEDURE — 12013 RPR F/E/E/N/L/M 2.6-5.0 CM: CPT

## 2024-11-14 PROCEDURE — 93005 ELECTROCARDIOGRAM TRACING: CPT | Performed by: EMERGENCY MEDICINE

## 2024-11-14 PROCEDURE — 99283 EMERGENCY DEPT VISIT LOW MDM: CPT

## 2024-11-14 ASSESSMENT — PAIN SCALES - GENERAL: PAINLEVEL_OUTOF10: 7

## 2024-11-14 ASSESSMENT — PAIN DESCRIPTION - LOCATION: LOCATION: FACE

## 2024-11-14 ASSESSMENT — PAIN DESCRIPTION - PAIN TYPE: TYPE: ACUTE PAIN

## 2024-11-14 ASSESSMENT — PAIN DESCRIPTION - FREQUENCY: FREQUENCY: CONTINUOUS

## 2024-11-14 ASSESSMENT — PAIN DESCRIPTION - ONSET: ONSET: SUDDEN

## 2024-11-14 ASSESSMENT — PAIN - FUNCTIONAL ASSESSMENT: PAIN_FUNCTIONAL_ASSESSMENT: 0-10

## 2024-11-14 ASSESSMENT — PAIN DESCRIPTION - ORIENTATION: ORIENTATION: RIGHT

## 2024-11-14 NOTE — ED NOTES
Pt presents to the ER for a facial injury. Pt states that he was in his garage and was pulled the string for his pull down ladder and the whole ladder fell out of the celing and hit him in the face. Pt's L cheek is swollen and has a laceration on his cheek. Pt states that he did get sick and is dizzy.

## 2024-11-15 LAB
EKG ATRIAL RATE: 94 BPM
EKG P AXIS: 60 DEGREES
EKG P-R INTERVAL: 148 MS
EKG Q-T INTERVAL: 350 MS
EKG QRS DURATION: 100 MS
EKG QTC CALCULATION (BAZETT): 437 MS
EKG R AXIS: 57 DEGREES
EKG T AXIS: 60 DEGREES
EKG VENTRICULAR RATE: 94 BPM

## 2024-11-15 NOTE — ED NOTES
Pt resting in bed, no concerns noted. Call light in reach. Family at bedside. Dr. Narayanan at bedside.

## 2024-11-15 NOTE — ED PROVIDER NOTES
[facility] EMERGENCY DEPT      CHIEF COMPLAINT     No chief complaint on file.      Nurses Notes reviewed and I agree except as noted in the HPI.      HISTORY OF PRESENT ILLNESS    Cesar Mcgregor is a 44 y.o. male who presents with complaint of laceration to left cheek, states that a attic ladder struck him in the face.  Denies loss of consciousness, no neck pain, does not take blood thinners.  States that he felt nauseous after he got hit.  No vomiting, back to baseline.  Onset: Acute  Duration: Prior to arrival  Timing: Single event  Location of Pain: Right cheek laceration  Intesity/severity:   Modifying Factors: `  Relieved by;  Previous Episodes;  Tx Before arrival: None    PAST MEDICAL HISTORY    has no past medical history on file.    SURGICAL HISTORY      has a past surgical history that includes cyst removal; Tonsillectomy and adenoidectomy; [place] tooth extraction; pr office/outpt visit,procedure only (Left, 4/13/2018); and fracture surgery (Left, 2018).    CURRENT MEDICATIONS       Discharge Medication List as of 11/14/2024  8:00 PM        CONTINUE these medications which have NOT CHANGED    Details   buPROPion (WELLBUTRIN SR) 150 MG extended release tablet Take 1 tablet by mouth 2 times daily, Disp-60 tablet, R-5Normal      varenicline (CHANTIX STARTING MONTH PAK) 0.5 MG X 11 & 1 MG X 42 tablet Take by mouth., Disp-1 box, R-0Normal             ALLERGIES     has No Known Allergies.    FAMILY HISTORY     He indicated that his mother is alive. He indicated that his father is alive. He indicated that his brother is alive.   family history includes Heart Disease in his brother; No Known Problems in his father and mother.    SOCIAL HISTORY      reports that he has quit smoking. He has never used smokeless tobacco. He reports that he does not currently use alcohol. He reports that he does not use drugs.    PHYSICAL EXAM     INITIAL VITALS:  height is 1.778 m (5' 10\") and weight is 88.5 kg (195 lb). His

## 2024-11-19 NOTE — DISCHARGE INSTR - COC
24 88.5 kg (195 lb)     Mental Status:  {IP PT MENTAL STATUS:18190}    IV Access:  { RON IV ACCESS:318799912}    Nursing Mobility/ADLs:  Walking   {CHP DME ADLs:609112403}  Transfer  {CHP DME ADLs:010501516}  Bathing  {CHP DME ADLs:752292398}  Dressing  {CHP DME ADLs:254260724}  Toileting  {CHP DME ADLs:187950870}  Feeding  {CHP DME ADLs:683628598}  Med Admin  {CHP DME ADLs:936752279}  Med Delivery   { RON MED Delivery:862556026}    Wound Care Documentation and Therapy:        Elimination:  Continence:   Bowel: {YES / NO:}  Bladder: {YES / NO:}  Urinary Catheter: {Urinary Catheter:716719065}   Colostomy/Ileostomy/Ileal Conduit: {YES / NO:}       Date of Last BM: ***  No intake or output data in the 24 hours ending 24  No intake/output data recorded.    Safety Concerns:     { RON Safety Concerns:437760614}    Impairments/Disabilities:      { RON Impairments/Disabilities:524862064}    Nutrition Therapy:  Current Nutrition Therapy:   { RON Diet List:785442963}    Routes of Feeding: {[facility] DME Other Feedings:630128346}  Liquids: {Slp liquid thickness:76429}  Daily Fluid Restriction: {CHP DME Yes amt example:117051897}  Last Modified Barium Swallow with Video (Video Swallowing Test): {Done Not Done Date:}    Treatments at the Time of Hospital Discharge:   Respiratory Treatments: ***  Oxygen Therapy:  {Therapy; copd oxygen:75588}  Ventilator:    { CC Vent List:378879883}    Rehab Therapies: {THERAPEUTIC INTERVENTION:1180831990}  Weight Bearing Status/Restrictions: {[facility] Weight Bearin}  Other Medical Equipment (for information only, NOT a DME order):  {EQUIPMENT:777740958}  Other Treatments: ***    Patient's personal belongings (please select all that are sent with patient):  {[facility] DME Belongings:477183086}    RN SIGNATURE:  {Esignature:246627270}    CASE MANAGEMENT/SOCIAL WORK SECTION    Inpatient Status Date: ***    Readmission Risk Assessment  Street

## 2025-03-18 ENCOUNTER — OFFICE VISIT (OUTPATIENT)
Dept: FAMILY MEDICINE CLINIC | Age: 45
End: 2025-03-18

## 2025-03-18 VITALS
RESPIRATION RATE: 14 BRPM | WEIGHT: 195.25 LBS | HEIGHT: 70 IN | BODY MASS INDEX: 27.95 KG/M2 | HEART RATE: 96 BPM | SYSTOLIC BLOOD PRESSURE: 132 MMHG | DIASTOLIC BLOOD PRESSURE: 84 MMHG

## 2025-03-18 DIAGNOSIS — R10.9 BILATERAL FLANK PAIN: Primary | ICD-10-CM

## 2025-03-18 LAB
BACTERIA URINE, POC: NORMAL
BILIRUBIN URINE: 0.2 MG/DL
BLOOD, URINE: NEGATIVE
CASTS URINE, POC: NORMAL
CLARITY, UA: CLEAR
COLOR, UA: YELLOW
CRYSTALS URINE, POC: NORMAL
EPI CELLS URINE, POC: NORMAL
GLUCOSE URINE: NORMAL
KETONES, URINE: NEGATIVE
LEUKOCYTE EST, POC: NORMAL
NITRITE, URINE: NEGATIVE
PH UA: 5 (ref 4.5–8)
PROTEIN UA: NEGATIVE
RBC URINE, POC: NORMAL
SPECIFIC GRAVITY UA: 1 (ref 1–1.03)
UROBILINOGEN, URINE: NORMAL
WBC URINE, POC: NORMAL
YEAST URINE, POC: NORMAL

## 2025-03-18 PROCEDURE — 81000 URINALYSIS NONAUTO W/SCOPE: CPT | Performed by: FAMILY MEDICINE

## 2025-03-18 PROCEDURE — 99214 OFFICE O/P EST MOD 30 MIN: CPT | Performed by: FAMILY MEDICINE

## 2025-03-18 RX ORDER — MELOXICAM 15 MG/1
15 TABLET ORAL DAILY
Qty: 10 TABLET | Refills: 0 | Status: SHIPPED | OUTPATIENT
Start: 2025-03-18

## 2025-03-18 SDOH — ECONOMIC STABILITY: FOOD INSECURITY: WITHIN THE PAST 12 MONTHS, YOU WORRIED THAT YOUR FOOD WOULD RUN OUT BEFORE YOU GOT MONEY TO BUY MORE.: NEVER TRUE

## 2025-03-18 SDOH — ECONOMIC STABILITY: FOOD INSECURITY: WITHIN THE PAST 12 MONTHS, THE FOOD YOU BOUGHT JUST DIDN'T LAST AND YOU DIDN'T HAVE MONEY TO GET MORE.: NEVER TRUE

## 2025-03-18 ASSESSMENT — ENCOUNTER SYMPTOMS
SINUS PRESSURE: 0
SHORTNESS OF BREATH: 0
NAUSEA: 0
CONSTIPATION: 0

## 2025-03-18 ASSESSMENT — PATIENT HEALTH QUESTIONNAIRE - PHQ9
SUM OF ALL RESPONSES TO PHQ QUESTIONS 1-9: 0
2. FEELING DOWN, DEPRESSED OR HOPELESS: NOT AT ALL
SUM OF ALL RESPONSES TO PHQ QUESTIONS 1-9: 0
1. LITTLE INTEREST OR PLEASURE IN DOING THINGS: NOT AT ALL

## 2025-03-18 NOTE — PROGRESS NOTES
Cesar Mcgregor (:  1980) is a 44 y.o. male,Established patient, here for evaluation of the following chief complaint(s):  Lower Back Pain (Symptoms started 2 weeks ago/)         Assessment & Plan  Bilateral flank pain       Orders:    POC URINE with Microscopic    meloxicam (MOBIC) 15 MG tablet; Take 1 tablet by mouth daily      Let me know in 10 days if the soreness continues  Let me know if you decide to have the colonoscopy done.  See me in a year       Subjective   HPI  He has noticed some soreness in the lower flank areas for the past 2 weeks.  It is one side or another but can be both at once  There is radiation into the suprapubic and testicular areas  No past episodes and no discharge  We reviewed colonoscopy and he does not want to do that.  Review of Systems   Constitutional:  Negative for fatigue.   HENT:  Negative for sinus pressure.    Eyes:  Negative for visual disturbance.   Respiratory:  Negative for shortness of breath.    Cardiovascular:  Negative for chest pain.   Gastrointestinal:  Negative for constipation and nausea.   Genitourinary:  Positive for flank pain and testicular pain. Negative for penile discharge.   Musculoskeletal:  Negative for arthralgias.   Skin:  Negative for rash.   Neurological:  Negative for headaches.   The patient's medications, allergies, past medical problems, surgical, social, and family histories were reviewed and updated as needed.         Objective   Physical Exam  Constitutional:       Appearance: Normal appearance. He is well-developed.   HENT:      Head: Normocephalic and atraumatic.   Eyes:      General: No scleral icterus.     Conjunctiva/sclera: Conjunctivae normal.   Neck:      Trachea: No tracheal deviation.   Cardiovascular:      Rate and Rhythm: Normal rate and regular rhythm.      Heart sounds: Normal heart sounds.   Pulmonary:      Effort: Pulmonary effort is normal.      Breath sounds: Normal breath sounds.   Abdominal:      General:

## 2025-03-18 NOTE — PATIENT INSTRUCTIONS
Let me know in 10 days if the soreness continues  Let me know if you decide to have the colonoscopy done.  See me in a year

## 2025-06-04 ENCOUNTER — OFFICE VISIT (OUTPATIENT)
Dept: FAMILY MEDICINE CLINIC | Age: 45
End: 2025-06-04

## 2025-06-04 VITALS
SYSTOLIC BLOOD PRESSURE: 136 MMHG | HEART RATE: 88 BPM | DIASTOLIC BLOOD PRESSURE: 80 MMHG | RESPIRATION RATE: 18 BRPM | BODY MASS INDEX: 27.54 KG/M2 | HEIGHT: 70 IN | WEIGHT: 192.38 LBS

## 2025-06-04 DIAGNOSIS — L65.9 ALOPECIA: Primary | ICD-10-CM

## 2025-06-04 PROCEDURE — 99213 OFFICE O/P EST LOW 20 MIN: CPT | Performed by: FAMILY MEDICINE

## 2025-06-04 ASSESSMENT — ENCOUNTER SYMPTOMS
SHORTNESS OF BREATH: 0
CONSTIPATION: 0
SINUS PRESSURE: 0

## 2025-06-04 NOTE — PROGRESS NOTES
Cesar Mcgregor (:  1980) is a 44 y.o. male,Established patient, here for evaluation of the following chief complaint(s):  Alopecia         Assessment & Plan  Alopecia       Orders:    Hay Santiago DO, Dermatology, [place]      See me in March for the wellness       Subjective   HPI  He states that a patch of hair fell out a few months ago  No pain and it's gotten bigger.  He hasn't used anything on it  Review of Systems   Constitutional:  Negative for fatigue.   HENT:  Negative for sinus pressure.    Eyes:  Negative for visual disturbance.   Respiratory:  Negative for shortness of breath.    Cardiovascular:  Negative for chest pain.   Gastrointestinal:  Negative for constipation.   Genitourinary: Negative.    Musculoskeletal:  Negative for arthralgias.   Skin:  Negative for rash.   Neurological:  Negative for headaches.        The patient's medications, allergies, past medical problems, surgical, social, and family histories were reviewed and updated as needed.    Objective   Physical Exam  Constitutional:       Appearance: Normal appearance. He is well-developed.   HENT:      Head: Normocephalic and atraumatic.     Eyes:      General: No scleral icterus.     Conjunctiva/sclera: Conjunctivae normal.   Neck:      Trachea: No tracheal deviation.   Cardiovascular:      Rate and Rhythm: Normal rate and regular rhythm.      Heart sounds: Normal heart sounds.   Pulmonary:      Effort: Pulmonary effort is normal.      Breath sounds: Normal breath sounds.   Skin:     General: Skin is warm and dry.   Neurological:      General: No focal deficit present.      Mental Status: He is alert.   Psychiatric:         Behavior: Behavior normal.        Blood pressure 136/80, pulse 88, resp. rate 18, height 1.778 m (5' 10\"), weight 87.3 kg (192 lb 6 oz).          An electronic signature was used to authenticate this note.    --Luiz Edmond MD

## 2025-06-04 NOTE — PROGRESS NOTES
Dr Hay Coreas- Dermatology (#: [phone]) requested we fax the Referral to Fax #: [phone]. They will contact the patient with an appt day and time after they Review the Referral.

## (undated) DEVICE — GLOVE SURG SZ 9 THK91MIL LTX FREE SYN POLYISOPRENE ANTI

## (undated) DEVICE — INTEGUSEAL MICROBIAL SEALANT: Brand: AVANOS

## (undated) DEVICE — BIT DRILL 2.7

## (undated) DEVICE — GLOVE ORANGE PI 8 1/2   MSG9085

## (undated) DEVICE — 2.0MM DRILL BIT W/QUICK CONNECT: Brand: PERI-LOC

## (undated) DEVICE — SOLUTION IV IRRIG POUR BRL 0.9% SODIUM CHL 2F7124

## (undated) DEVICE — SUREFIT, DUAL DISPERSIVE ELECTRODE, CONTACT QUALITY MONITOR: Brand: SUREFIT

## (undated) DEVICE — YANKAUER,BULB TIP,W/O VENT,RIGID,STERILE: Brand: MEDLINE

## (undated) DEVICE — 2.7MM SHORT DRILL BIT W/QUICK CONNECT: Brand: PERI-LOC

## (undated) DEVICE — DRAPE C ARM W36XL30IN RECTANG BND BG AND TAPE

## (undated) DEVICE — 4.5MM SHORT DRILL BIT W/QUICK CONNECT: Brand: PERI-LOC

## (undated) DEVICE — TUBING, SUCTION, 1/4" X 20', STRAIGHT: Brand: MEDLINE INDUSTRIES, INC.

## (undated) DEVICE — SYSTEM SKIN CLSR 22CM DERMBND PRINEO

## (undated) DEVICE — SPONGE LAP W18XL18IN WHT COT 4 PLY FLD STRUNG RADPQ DISP ST

## (undated) DEVICE — C-ARM: Brand: UNBRANDED

## (undated) DEVICE — ROYAL SILK SURGICAL GOWN, XXL: Brand: CONVERTORS

## (undated) DEVICE — GAUZE,SPONGE,8"X4",12PLY,XRAY,STRL,LF: Brand: MEDLINE

## (undated) DEVICE — 2.7MM DRILL BIT W/QUICK CONNECT: Brand: PERI-LOC

## (undated) DEVICE — 3.5MM DRILL BIT W/QUICK CONNECT: Brand: PERI-LOC

## (undated) DEVICE — GLOVE ORANGE PI 7 1/2   MSG9075